# Patient Record
Sex: MALE | Race: WHITE | NOT HISPANIC OR LATINO | Employment: OTHER | URBAN - METROPOLITAN AREA
[De-identification: names, ages, dates, MRNs, and addresses within clinical notes are randomized per-mention and may not be internally consistent; named-entity substitution may affect disease eponyms.]

---

## 2017-03-10 ENCOUNTER — TELEPHONE (OUTPATIENT)
Dept: SPORTS MEDICINE | Facility: CLINIC | Age: 52
End: 2017-03-10

## 2017-03-10 ENCOUNTER — OFFICE VISIT (OUTPATIENT)
Dept: ORTHOPEDICS | Facility: CLINIC | Age: 52
End: 2017-03-10
Payer: COMMERCIAL

## 2017-03-10 VITALS
HEART RATE: 84 BPM | BODY MASS INDEX: 26.92 KG/M2 | RESPIRATION RATE: 18 BRPM | SYSTOLIC BLOOD PRESSURE: 129 MMHG | DIASTOLIC BLOOD PRESSURE: 88 MMHG | WEIGHT: 188 LBS | HEIGHT: 70 IN

## 2017-03-10 DIAGNOSIS — S46.111A BICEPS TENDON TEAR, RIGHT, INITIAL ENCOUNTER: Primary | ICD-10-CM

## 2017-03-10 PROCEDURE — 1160F RVW MEDS BY RX/DR IN RCRD: CPT | Mod: S$GLB,,, | Performed by: ORTHOPAEDIC SURGERY

## 2017-03-10 PROCEDURE — 99999 PR PBB SHADOW E&M-NEW PATIENT-LVL III: CPT | Mod: PBBFAC,,, | Performed by: ORTHOPAEDIC SURGERY

## 2017-03-10 PROCEDURE — 99203 OFFICE O/P NEW LOW 30 MIN: CPT | Mod: S$GLB,,, | Performed by: ORTHOPAEDIC SURGERY

## 2017-03-10 RX ORDER — RABEPRAZOLE SODIUM 20 MG/1
TABLET, DELAYED RELEASE ORAL
Refills: 2 | COMMUNITY
Start: 2017-02-23

## 2017-03-10 RX ORDER — SULFACETAMIDE SODIUM, SULFUR 100; 50 MG/G; MG/G
EMULSION TOPICAL
Refills: 1 | COMMUNITY
Start: 2017-01-10

## 2017-03-10 RX ORDER — BROMPHENIRAMINE MALEATE, PSEUDOEPHEDRINE HYDROCHLORIDE, AND DEXTROMETHORPHAN HYDROBROMIDE 2; 30; 10 MG/5ML; MG/5ML; MG/5ML
SYRUP ORAL
Refills: 5 | COMMUNITY
Start: 2017-02-23 | End: 2022-09-20

## 2017-03-10 RX ORDER — BUPROPION HYDROCHLORIDE 150 MG/1
TABLET ORAL
Refills: 1 | COMMUNITY
Start: 2017-02-23 | End: 2020-01-22 | Stop reason: SDUPTHER

## 2017-03-10 RX ORDER — FEXOFENADINE HYDROCHLORIDE AND PSEUDOEPHEDRINE HYDROCHLORIDE 60; 120 MG/1; MG/1
TABLET, FILM COATED, EXTENDED RELEASE ORAL
Refills: 2 | COMMUNITY
Start: 2017-03-06

## 2017-03-10 NOTE — TELEPHONE ENCOUNTER
The patient is referred to our office by Dr. Stoll. He was scheduled a visit to come see Hima Wen following his MRI

## 2017-03-10 NOTE — PROGRESS NOTES
"DATE: 3/10/2017  PATIENT: Noel Sage Jr.    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 The pain has been present for approximately 2 weeks. It began while attempting to pick-up a heavy object. He reports a "tearing" sensation at the anterior and medial aspect of the elbow. Since then, he has had persistent pain and weaknes. The patient describes the pain as burning.  The pain is worse with any weightbearing or elbow flexion, and improved by rest. There is no associated numbness and tingling.  No treatment thus far.      PAST MEDICAL/SURGICAL HISTORY:  History reviewed. No pertinent past medical history.  Past Surgical History:   Procedure Laterality Date    COLONOSCOPY      ESOPHAGOGASTRODUODENOSCOPY      LEG SURGERY Left     torn muscle, repaired, age 18's       Current Medications:   Current Outpatient Prescriptions:     ALLEGRA-D 12 HOUR  mg per tablet, , Disp: , Rfl: 2    buPROPion (WELLBUTRIN XL) 150 MG TB24 tablet, TK 1 T PO D, Disp: , Rfl: 1    LACTOBACILLUS ACIDOPHILUS (PROBIOTIC ORAL), Take by mouth once daily., Disp: , Rfl:     rabeprazole (ACIPHEX) 20 mg tablet, TK 1 T PO QD, Disp: , Rfl: 2    sulfacetamide sodium-sulfur 10-5 % (w/w) Clsr, , Disp: , Rfl: 1    brompheniramine-pseudoeph-DM 2-30-10 mg/5 mL Syrp, TK 1 TO 2 TEA PO Q 6 H PRN CNC, Disp: , Rfl: 5    Social History:   Social History     Social History    Marital status:      Spouse name: N/A    Number of children: N/A    Years of education: N/A     Occupational History    Not on file.     Social History Main Topics    Smoking status: Current Some Day Smoker     Types: Cigars    Smokeless tobacco: Never Used    Alcohol use Yes      Comment: on weekends occasionally    Drug use: No    Sexual activity: Not on file     Other Topics Concern    Not on file     Social History Narrative    No narrative on file       REVIEW OF " "SYSTEMS:  Constitution: Negative. Negative for chills, fever and night sweats.   Cardiovascular: Negative for chest pain and syncope.   Respiratory: Negative for cough and shortness of breath.   Gastrointestinal: See HPI. Negative for nausea/vomiting. Negative for abdominal pain.  Genitourinary: See HPI. Negative for discoloration or dysuria.  Skin: Negative for dry skin, itching and rash.   Hematologic/Lymphatic: Negative for bleeding problem. Does not bruise/bleed easily.   Musculoskeletal: Negative for falls and muscle weakness.   Neurological: See HPI. No seizures.   Endocrine: Negative for polydipsia, polyphagia and polyuria.   Allergic/Immunologic: Negative for hives and persistent infections.    PHYSICAL EXAMINATION:    /88 (BP Location: Left arm, Patient Position: Sitting, BP Method: Automatic)  Pulse 84  Resp 18  Ht 5' 10" (1.778 m)  Wt 85.3 kg (188 lb)  BMI 26.98 kg/m2    General: The patient is a very pleasant 51 y.o. male in no apparent distress, the patient is orientatied to person, place and time.   Psych: Normal mood and affect  HEENT:  NCAT  Lungs:  Respirations are equal and unlabored.  CV:  2+ bilateral upper and lower extremity pulses.  Skin:  Intact throughout.    MSK:   - Right UE: skin intact with no swelling or ecchymoses. Mildly TTP at biceps insertion on radius. No iza hook or francisca deformity. Substantial weakness with elbow flexion and supination. +biceps atrophy. Stable to varus/valgus stress. NVI distally.      ASSESSMENT/PLAN:    Noel was seen today for elbow injury.    Diagnoses and all orders for this visit:    Biceps tendon tear, right, initial encounter  -     MRI Upper Extremity Joint W Wo Contrast Right; Future      No Follow-up on file.    The patient's history and physical exam is concerning for an insertional biceps rupture versus strain.  Will order MRI and refer to the Sports Medicine clinic for further evaluation.    I have personally taken the history and " examined this patient and agree with the residents note as stated above.  Will call with result and refer to Sports medicine.

## 2017-03-14 ENCOUNTER — HOSPITAL ENCOUNTER (OUTPATIENT)
Dept: RADIOLOGY | Facility: HOSPITAL | Age: 52
Discharge: HOME | End: 2017-03-14
Attending: ORTHOPAEDIC SURGERY
Payer: COMMERCIAL

## 2017-03-14 DIAGNOSIS — S46.111A BICEPS TENDON TEAR, RIGHT, INITIAL ENCOUNTER: ICD-10-CM

## 2017-03-14 PROCEDURE — 73221 MRI JOINT UPR EXTREM W/O DYE: CPT | Mod: 26,RT,, | Performed by: RADIOLOGY

## 2017-03-14 PROCEDURE — 73221 MRI JOINT UPR EXTREM W/O DYE: CPT | Mod: TC,RT

## 2017-03-15 ENCOUNTER — TELEPHONE (OUTPATIENT)
Dept: SPORTS MEDICINE | Facility: CLINIC | Age: 52
End: 2017-03-15

## 2017-03-15 DIAGNOSIS — S46.119S: Primary | ICD-10-CM

## 2017-03-15 NOTE — TELEPHONE ENCOUNTER
Spoke with Karolyn about her 's appt with Hima on 3/16/17. Karolyn stated that her Noel has gotten medical clearance from his PCP for his upcoming surgery. Made her aware that the surgery will be with Dr. Wilder whenever she does schedule surgery for him.    ----- Message from Kamilah De León sent at 3/15/2017 11:23 AM CDT -----  Contact: [email or web address] [phone]  She is calling to speak with a staff member to see which Dr Joseph Will perform  Her  surgery.

## 2017-03-16 ENCOUNTER — HOSPITAL ENCOUNTER (OUTPATIENT)
Dept: RADIOLOGY | Facility: HOSPITAL | Age: 52
Discharge: HOME | End: 2017-03-16
Attending: ORTHOPAEDIC SURGERY
Payer: COMMERCIAL

## 2017-03-16 ENCOUNTER — OFFICE VISIT (OUTPATIENT)
Dept: SPORTS MEDICINE | Facility: CLINIC | Age: 52
End: 2017-03-16
Payer: COMMERCIAL

## 2017-03-16 VITALS
SYSTOLIC BLOOD PRESSURE: 129 MMHG | RESPIRATION RATE: 20 BRPM | BODY MASS INDEX: 26.92 KG/M2 | HEART RATE: 94 BPM | WEIGHT: 188 LBS | HEIGHT: 70 IN | TEMPERATURE: 98 F | DIASTOLIC BLOOD PRESSURE: 81 MMHG

## 2017-03-16 DIAGNOSIS — S46.211A RUPTURE OF DISTAL BICEPS TENDON, RIGHT, INITIAL ENCOUNTER: Primary | ICD-10-CM

## 2017-03-16 DIAGNOSIS — M25.521 RIGHT ELBOW PAIN: ICD-10-CM

## 2017-03-16 PROCEDURE — 73080 X-RAY EXAM OF ELBOW: CPT | Mod: 26,RT,, | Performed by: RADIOLOGY

## 2017-03-16 PROCEDURE — 1160F RVW MEDS BY RX/DR IN RCRD: CPT | Mod: S$GLB,,, | Performed by: PHYSICIAN ASSISTANT

## 2017-03-16 PROCEDURE — 73080 X-RAY EXAM OF ELBOW: CPT | Mod: TC,PO,RT

## 2017-03-16 PROCEDURE — 99203 OFFICE O/P NEW LOW 30 MIN: CPT | Mod: S$GLB,,, | Performed by: PHYSICIAN ASSISTANT

## 2017-03-16 PROCEDURE — 99999 PR PBB SHADOW E&M-EST. PATIENT-LVL V: CPT | Mod: PBBFAC,,, | Performed by: PHYSICIAN ASSISTANT

## 2017-03-16 RX ORDER — TRAMADOL HYDROCHLORIDE 50 MG/1
50-100 TABLET ORAL EVERY 6 HOURS PRN
Qty: 60 TABLET | Refills: 0 | Status: SHIPPED | OUTPATIENT
Start: 2017-03-16 | End: 2020-01-30

## 2017-03-16 RX ORDER — INDOMETHACIN 25 MG/1
75 CAPSULE ORAL DAILY
Qty: 12 CAPSULE | Refills: 0 | Status: ON HOLD | OUTPATIENT
Start: 2017-03-16 | End: 2017-03-21 | Stop reason: HOSPADM

## 2017-03-16 RX ORDER — NAPROXEN 500 MG/1
500 TABLET ORAL EVERY 12 HOURS
Qty: 42 TABLET | Refills: 0 | Status: ON HOLD | OUTPATIENT
Start: 2017-03-16 | End: 2017-03-21 | Stop reason: HOSPADM

## 2017-03-16 RX ORDER — OXYCODONE HCL 10 MG/1
10 TABLET, FILM COATED, EXTENDED RELEASE ORAL
Status: CANCELLED | OUTPATIENT
Start: 2017-03-16 | End: 2017-03-16

## 2017-03-16 RX ORDER — OXYCODONE AND ACETAMINOPHEN 10; 325 MG/1; MG/1
TABLET ORAL
Qty: 60 TABLET | Refills: 0 | Status: SHIPPED | OUTPATIENT
Start: 2017-03-16 | End: 2020-01-30

## 2017-03-16 RX ORDER — PROMETHAZINE HYDROCHLORIDE 25 MG/1
25 TABLET ORAL EVERY 6 HOURS PRN
Qty: 30 TABLET | Refills: 0 | Status: SHIPPED | OUTPATIENT
Start: 2017-03-16 | End: 2022-09-20

## 2017-03-16 RX ORDER — PREGABALIN 25 MG/1
75 CAPSULE ORAL
Status: CANCELLED | OUTPATIENT
Start: 2017-03-16 | End: 2017-03-16

## 2017-03-16 NOTE — MR AVS SNAPSHOT
[facility]  [address]  [facility] [address]  Phone: [phone]                  Noel Sage JrOpal   3/16/2017 1:00 PM   Appointment    Description:  Male : 1965   Provider:  Chandler Wne III, PA-C   Department:  [facility]                To Do List           Future Appointments        Provider Department Dept Phone    3/16/2017 1:00 PM Chandler Wen III, PA-C [facility] [phone]    3/17/2017 8:30 AM PRE-ADMIT, [facility] [phone]    3/31/2017 10:30 AM Mara Wilder MD [facility] [phone]      Your Future Surgeries/Procedures     Mar 21, 2017   Surgery with Mara Wilder MD   [facility] ([facility])    [address]  [facility] [address] [phone]              Goals (5 Years of Data)     None      [facility]     [facility] On [facility] Nurse Care Line -  Assistance  Registered nurses in the [facility] Center provide clinical advisement, health education, appointment booking, and other advisory services.  Call for this free service at [phone].             Medications                Verify that the below list of medications is an accurate representation of the medications you are currently taking.  If none reported, the list may be blank. If incorrect, please contact your healthcare provider. Carry this list with you in case of emergency.           Current Medications     ALLEGRA-D 12 HOUR  mg per tablet     brompheniramine-pseudoeph-DM 2-30-10 mg/5 mL Syrp TK 1 TO 2 TEA PO Q 6 H PRN CNC    buPROPion (WELLBUTRIN XL) 150 MG TB24 tablet TK 1 T PO D    LACTOBACILLUS ACIDOPHILUS (PROBIOTIC ORAL) Take by mouth once daily.    rabeprazole (ACIPHEX) 20 mg tablet TK 1 T PO QD    sulfacetamide sodium-sulfur 10-5 % (w/w) Clsr            Clinical Reference Information           Allergies as of 3/16/2017     Penicillins       Immunizations Administered on Date of Encounter - 3/16/2017     None      [facility] Sign-Up     Activating your [facility] account is as easy as 1-2-3!     1) Visit [email or web address], select Sign Up Now, enter this activation code and your date of birth, then select Next.  [email or web address]  Expires: 4/30/2017 12:50 PM      2) Create a username and password to use when you visit [facility] in the future and select a security question in case you lose your password and select Next.    3) Enter your e-mail address and click Sign Up!    Additional Information  If you have questions, please e-mail [email or web address] or call [phone] to talk to our [facility] staff. Remember, [facility] is NOT to be used for urgent needs. For medical emergencies, dial 911.         Smoking Cessation     If you would like to quit smoking:   You may be eligible for free services if you are a [place] resident and started smoking cigarettes before September 1, 1988.  Call the Smoking Cessation Trust ([facility]) toll free at (413) [phone] or ([phone].   Call 8-283-QUIT-NOW if you do not meet the above criteria.            Language Assistance Services     ATTENTION: Language assistance services are available, free of charge. Please call [phone].      ATENCIÓN: Si habla español, tiene a james disposición servicios gratuitos de asistencia lingüística. Llame al [phone].     CHÚ Ý: N?u b?n nói Ti?ng Vi?t, có các d?ch v? h? tr? ngôn ng? mi?n phí dành cho b?n. G?i s? [phone].         [facility] complies with applicable Federal civil rights laws and does not discriminate on the basis of race, color, national origin, age, disability, or sex.

## 2017-03-16 NOTE — H&P
Noel Sage Jr.  is here for a completion of his perioperative paperwork. he  Is scheduled to undergo     Right Distal short head biceps repair +/- tendon allograft assistance on 3/21/18.      He is a healthy individual and does need clearance for this procedure Which he got from Dr. Espinoza.     Risks, indications and benefits of the surgical procedure were discussed with the patient. All questions with regard to surgery, rehab, expected return to functional activities, activities of daily living and recreational endeavors were answered to his satisfaction.    Once no other questions were asked, a brief history and physical exam was then performed.      PHYSICAL EXAM:  GEN: A&Ox3, WD WN NAD  HEENT: WNL  CHEST: CTAB, no W/R/R  HEART: RRR, no M/R/G  ABD: Soft, NT ND, BS x4 QUADS  MS; See Epic  NEURO: CN II-XII intact       The surgical consent was then reviewed with the patient, who agreed with all the contents of the consent form and it was signed. he was then given the [facility] surgery packet to bring with him to [facility] for the anesthesia portion of his perioperative paperwork.   For all physicians except for Dr. Grigsby, we will email and possibly fax the consent forms and [facility]    PHYSICAL THERAPY:  He was also instructed regarding physical therapy and will begin on  TBD date. He was given a copy of the original prescription to schedule. Another copy of this prescription was also faxed to [facility].    POST OP CARE:instructions were reviewed including care of the wound and dressing after surgery and when he can shower.     PAIN MANAGEMENT: Noel Sage Jr. was also given his pain management regime, which includes the TENS unit given to him by umesh along with the education required for its use. He was also instructed regarding the Polar ice unit that will be in place after surgery and his postoperative pain medications.     PAIN MEDICATION:  Percocet  10/325mg 1 po q 4-6 hours prn pain  Ultram 50 mg one p.o. q.4-6 hours p.r.n. breakthrough pain,   Phenergan 25 mg one p.o. q.4-6 hours p.r.n. nausea and vomiting.  Indocin 75mg po x 4 days  Naproxen 500mg po BID x 21 days (starting on POD5)  aciphex as already taking    As there were no other questions to be asked, he was given my business card along with Mara Wilder MD business card if he has any questions or concerns prior to surgery or in the postop period.

## 2017-03-16 NOTE — PROGRESS NOTES
CHIEF COMPLAINT: Right Elbow pain referral from Dr. Stoll (friend)                                             HISTORY OF PRESENT ILLNESS:  The patient is a 51 y.o. male  who presents  for evaluation of his right elbow pain.     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    He states that last week he was lifting a box and felt a pop and tear around his elbow. He then began having bruising and pain of the distal arm. He now has weakness of his arm and difficulty pronating and supinating the forearm.    Pain Duration: 1 weeks  Pain Quality: achy  Pain Context:unchanged  Pain Timing: intermittent  Pain Location:anterior  Pain Severity: mild  Aggrevating Factors:    Previous Treatments:  Associated Signs and Symptoms:none  History of Trauma: None    Pain is affecting ADLs.       PAST MEDICAL HISTORY: History reviewed. No pertinent past medical history.  PAST SURGICAL HISTORY:   Past Surgical History:   Procedure Laterality Date    COLONOSCOPY      ESOPHAGOGASTRODUODENOSCOPY      LEG SURGERY Left     torn muscle, repaired, age 18's     FAMILY HISTORY: History reviewed. No pertinent family history.  SOCIAL HISTORY:   Social History     Social History    Marital status:      Spouse name: N/A    Number of children: N/A    Years of education: N/A     Occupational History    Not on file.     Social History Main Topics    Smoking status: Current Some Day Smoker     Types: Cigars    Smokeless tobacco: Never Used    Alcohol use Yes      Comment: on weekends occasionally    Drug use: No    Sexual activity: Not on file     Other Topics Concern    Not on file     Social History Narrative       MEDICATIONS:   Current Outpatient Prescriptions:     ALLEGRA-D 12 HOUR  mg per tablet, , Disp: , Rfl: 2    brompheniramine-pseudoeph-DM 2-30-10 mg/5 mL Syrp, TK 1 TO 2 TEA PO Q 6 H PRN CNC, Disp: , Rfl: 5    buPROPion (WELLBUTRIN XL) 150 MG TB24 tablet, TK 1 T PO D, Disp: , Rfl:  "1    LACTOBACILLUS ACIDOPHILUS (PROBIOTIC ORAL), Take by mouth once daily., Disp: , Rfl:     rabeprazole (ACIPHEX) 20 mg tablet, TK 1 T PO QD, Disp: , Rfl: 2    indomethacin (INDOCIN) 25 MG capsule, Take 3 capsules (75 mg total) by mouth once daily. Take with food. Take on post-op days 1,2,3,&4., Disp: 12 capsule, Rfl: 0    naproxen (NAPROSYN) 500 MG tablet, Take 1 tablet (500 mg total) by mouth every 12 (twelve) hours. Take with food. Starting on post-op day 5, Disp: 42 tablet, Rfl: 0    oxycodone-acetaminophen (PERCOCET)  mg per tablet, Take 1 tablet by mouth every 4-6 hours as needed for pain. Take stool softener with this medication., Disp: 60 tablet, Rfl: 0    promethazine (PHENERGAN) 25 MG tablet, Take 1 tablet (25 mg total) by mouth every 6 (six) hours as needed for Nausea., Disp: 30 tablet, Rfl: 0    sulfacetamide sodium-sulfur 10-5 % (w/w) Clsr, , Disp: , Rfl: 1    tramadol (ULTRAM) 50 mg tablet, Take 1-2 tablets ( mg total) by mouth every 6 (six) hours as needed for Pain., Disp: 60 tablet, Rfl: 0  ALLERGIES:   Review of patient's allergies indicates:   Allergen Reactions    Penicillins      UNKNOWN       VITAL SIGNS: /81  Pulse 94  Temp 98.2 °F (36.8 °C) (Oral)   Resp 20  Ht 5' 10" (1.778 m)  Wt 85.3 kg (188 lb)  BMI 26.98 kg/m2     Review of Systems   Constitution: Negative for chills, fever, weakness and weight loss.   HENT: Negative for congestion.   Cardiovascular: Negative for chest pain and dyspnea on exertion.   Respiratory: Negative for cough and shortness of breath.   Hematologic/Lymphatic: Does not bruise/bleed easily.   Skin: Negative for rash and suspicious lesions.   Musculoskeletal: see HPI  Gastrointestinal: Negative for bowel incontinence, constipation,diarrhea, vomiting.   Genitourinary: Negative for bladder incontinence.   Neurological: Negative for numbness, paresthesias and sensory change.       PHYSICAL EXAM:  General: Patient appears alert and oriented " x 3.  Mood is pleasant.  Observation of ears, eyes and nose reveal no gross abnormalities.  No labored breathing observed.  Well nourished, in no acute distress and ambulates with a non-antalgic gait with no assistive devices.    Skin: Skin intact bilaterally. Sensation intact bilaterally. Compartments soft. No evidence of edema, infection, or induration.     Right ELBOW / WRIST EXTREMITY EXAM:    OBSERVATION / INSPECTION    Swelling  Mild of the distal arm    Deformity  none  Discoloration  Ecchymosis appreciated of the medial distal arm     Scars   none    Atrophy  none    TENDERNESS / CREPITUS (T / C):           T / C        Medial epicondyle   - / -    Med. (Ulnar) collateral ligament  - / -    Flexor pronator Musculature   - / -   Biceps tendon    + / -   Head of radius    - / -    Lateral epicondyle   - / -    Extensor Musculature   - / -   Brachioradialis   - / -   Triceps tendon   - / -   Triceps muscle   - / -   Olecranon    - / -   Olecranon bursa   - / -   Cubital fossa    - / -   Anterior jointline   - / -   Radial tunnel    -/ -             ROM: ('*' = with pain)    Right Elbow  AROM (PROM)     Extension   0 deg  (5 deg)   Flexion   145 deg (145 deg)         Pronation  90 deg  (90 deg)      Supination   80 deg  (80 deg)                 Left Elbow  AROM (PROM)     Extension   0 deg  (5 deg)   Flexion   145 deg (145 deg)*         Pronation  80 deg  (90 deg)*      Supination   80 deg  (80 deg)        Right Wrist  AROM (PROM)   Extension   80 deg (85 deg)   Flexion   80 deg (85 deg)         Ulnar Deviation   35 deg (40 deg)  Radial Deviation 35 deg (40 deg)             Left Wrist   AROM (PROM)     Extension   80 deg (85 deg)   Flexion   80 deg (85 deg)         Ulnar Deviation   35 deg (40 deg)  Radial Deviation 35 deg (40 deg)        STRENGTH: ('*' = with pain)    Elbow Flexion:   3+/5  Elbow Extension:  5/5  Wrist Flexion:   5/5  Wrist Extension:  5/5  :    5/5  Intrinsics:   5/5  EPL (Ext. pollicis  longus): 5/5  Pinch Mechanism:  5/5    ELBOW EXAMINATION:  See above noted areas of tenderness.   Test for Ligamentous Instability - UCL normal  Test for Ligamentous Instability - LUCL normal  PLRI       neg  Tinel's (Percussion) Test - Cubital  neg  Tennis Elbow Test    neg  Golfer's Elbow Test    neg  Radial Capitellar Grind Test   neg  Valgus/Extension Overload Test  neg  Resisted Long Finger Extension Pain neg  Moving Valgus     neg  Forearm pain with resisted supination neg    WRIST EXAMINATION:  See above noted areas of tenderness.   Finkelstein's Test   neg  Tinel's Test - Carpal Tunnel  neg  Phalen's Test    neg  Median Nerve Compression Test neg  Ulnar-sided Compression Test neg  LT Ballottment Test   neg  Snuff box tenderness   neg  Drew's Test   neg  LT Instability    neg  Hook of Hamate Tenderness  neg     EXTREMITY NEURO-VASCULAR EXAMINATION: Sensation grossly intact to light touch all dermatomal regions. DTR 2+ Biceps, Triceps, BR and Negative Mela's sign. Grossly intact motor function at Elbow, Wrist and Hand. Distal pulses radial and ulnar 2+, brisk cap refill, symmetric.    Other Findings:      Xrays: (AP, lateral, oblique) Right elbow ordered and reviewed by me personally today: no evidence of fracture or dislocation.  Osseous structures appear intact.    MRI: on 3/14/17  Complete tear of the short head component of the biceps tendon at the level of the radial tuberosity with approximately 2 cm of proximal retraction. Long head component demonstrates intact fibers inserting at the proximal aspect of the radial tuberosity. Lacertus fibrosus is intact.    ASSESSMENT:   Right elbow pain  Distal short head of biceps tendon rupture    PLAN:  I have discussed the nature of this problem with the patient today. We discussed both surgical and non-surgical options.     1. Patient would like to proceed with surgery.     PLAN: We have discussed the surgery and recovery of elbow surgery. he understands  that there may be limited arm mobility and a sling required for up to several weeks after surgery depending on procedures that are performed at the time of surgery.    The spectrum of treatment options were discussed with the patient, including nonoperative and operative options.  After thorough discussion, the patient has elected to undergo surgical treatment to include:  right  a. Distal short head biceps tendon repair +/- allograft assistance     The details of the surgical procedure were explained, including the location of probable incisions and a description of likely hardware and/or grafts to be used.  The patient understands the likely convalescence after surgery.  Also, we have thoroughly discussed the risks, benefits and alternatives to surgery, including, but not limited to, the risk of infection, joint stiffness, blood clot (including DVT and/or pulmonary embolus), neurologic and vascular injury.  It was explained that, if tissue has been repaired or reconstructed, there is a chance of failure, which may require further management.        2. Surgery in scheduled for next Tuesday the 21st.   3. Pre-op also performed at this visit.   All patients questions were answered. Patient was advised to call us with any concerns or questions.

## 2017-03-17 ENCOUNTER — HOSPITAL ENCOUNTER (OUTPATIENT)
Dept: PREADMISSION TESTING | Facility: OTHER | Age: 52
Discharge: HOME | End: 2017-03-17
Attending: ORTHOPAEDIC SURGERY
Payer: COMMERCIAL

## 2017-03-17 ENCOUNTER — ANESTHESIA EVENT (OUTPATIENT)
Dept: SURGERY | Facility: OTHER | Age: 52
End: 2017-03-17
Payer: COMMERCIAL

## 2017-03-17 VITALS
TEMPERATURE: 98 F | WEIGHT: 188 LBS | HEART RATE: 83 BPM | BODY MASS INDEX: 26.92 KG/M2 | DIASTOLIC BLOOD PRESSURE: 87 MMHG | OXYGEN SATURATION: 97 % | HEIGHT: 70 IN | SYSTOLIC BLOOD PRESSURE: 135 MMHG

## 2017-03-17 RX ORDER — SODIUM CHLORIDE 0.9 % (FLUSH) 0.9 %
3 SYRINGE (ML) INJECTION EVERY 8 HOURS
Status: CANCELLED | OUTPATIENT
Start: 2017-03-17

## 2017-03-17 RX ORDER — FAMOTIDINE 20 MG/1
20 TABLET, FILM COATED ORAL
Status: CANCELLED | OUTPATIENT
Start: 2017-03-17 | End: 2017-03-17

## 2017-03-17 RX ORDER — ACETAMINOPHEN 500 MG
500 TABLET ORAL EVERY 6 HOURS PRN
COMMUNITY

## 2017-03-17 RX ORDER — SODIUM CHLORIDE 0.9 % (FLUSH) 0.9 %
3 SYRINGE (ML) INJECTION
Status: DISCONTINUED | OUTPATIENT
Start: 2017-03-17 | End: 2017-03-18 | Stop reason: HOSPADM

## 2017-03-17 RX ORDER — ONDANSETRON 2 MG/ML
4 INJECTION INTRAMUSCULAR; INTRAVENOUS ONCE AS NEEDED
Status: CANCELLED | OUTPATIENT
Start: 2017-03-17 | End: 2017-03-17

## 2017-03-17 RX ORDER — FENTANYL CITRATE 50 UG/ML
25 INJECTION, SOLUTION INTRAMUSCULAR; INTRAVENOUS EVERY 5 MIN PRN
Status: CANCELLED | OUTPATIENT
Start: 2017-03-17

## 2017-03-17 RX ORDER — OXYCODONE HYDROCHLORIDE 5 MG/1
5 TABLET ORAL
Status: CANCELLED | OUTPATIENT
Start: 2017-03-17

## 2017-03-17 RX ORDER — MIDAZOLAM HYDROCHLORIDE 5 MG/ML
5 INJECTION INTRAMUSCULAR; INTRAVENOUS
Status: DISPENSED | OUTPATIENT
Start: 2017-03-17 | End: 2017-03-17

## 2017-03-17 RX ORDER — MEPERIDINE HYDROCHLORIDE 50 MG/ML
12.5 INJECTION INTRAMUSCULAR; INTRAVENOUS; SUBCUTANEOUS ONCE AS NEEDED
Status: CANCELLED | OUTPATIENT
Start: 2017-03-17 | End: 2017-03-17

## 2017-03-17 RX ORDER — HYDROMORPHONE HYDROCHLORIDE 2 MG/ML
0.4 INJECTION, SOLUTION INTRAMUSCULAR; INTRAVENOUS; SUBCUTANEOUS EVERY 5 MIN PRN
Status: CANCELLED | OUTPATIENT
Start: 2017-03-17

## 2017-03-17 NOTE — IP AVS SNAPSHOT
[facility] Location ([place])  [address] [place], [place] [id number]  Phone: [phone]           Patient Discharge Instructions    Our goal is to set you up for success. This packet includes information on your condition, medications, and your home care. It will help you to care for yourself so you don't get sicker.     Please ask your nurse if you have any questions.        There are many details to remember when preparing for your surgery. Here is what you will need to do, please ask your nurse if there are more specific instructions and if you have any questions:    1. 24 hours before procedure Do not smoke or drink alcoholic beverages 24 hours prior to your procedure    2. Eating before procedure Do not eat or drink anything 8 hours before your procedure - this includes gum, mints, and candy.     3. Day of procedure Please remove all jewelry for the procedure. If you wear contact lenses, dentures, hearing aids or glasses, bring a container to put them in during your surgery and give to a family member for safekeeping.  If your doctor has scheduled you for an overnight stay, bring a small overnight bag with any personal items that you need.    4. After procedure Make arrangements in advance for transportation home by a responsible adult. It is not safe to drive a vehicle during the 24 hours following surgery.     PLEASE NOTE: You may be contacted the day before your surgery to confirm your surgery date and arrival time. The Surgery schedule has many variables which may affect the time of your surgery case. Family members should be available if your surgery time changes.                [facility]  Unless otherwise directed by your provider, please contact [facility], our nurse care line that is available for 24/7 assistance.     [phone] (toll-free)    Registered nurses in the [facility] provide clinical advisement, health education, appointment booking, and other  advisory services.                    ** Verify the list of medication(s) below is accurate and up to date. Carry this with you in case of emergency. If your medications have changed, please notify your healthcare provider.             Medication List      TAKE these medications        Additional Info                      acetaminophen 500 MG tablet   Commonly known as:  TYLENOL   Refills:  0   Dose:  500 mg    Instructions:  Take 500 mg by mouth every 6 (six) hours as needed for Pain.     Begin Date    AM    Noon    PM    Bedtime       ALLEGRA-D 12 HOUR  mg per tablet   Refills:  2   Generic drug:  fexofenadine-pseudoephedrine  mg      Begin Date    AM    Noon    PM    Bedtime       brompheniramine-pseudoeph-DM 2-30-10 mg/5 mL Syrp   Refills:  5    Instructions:  TK 1 TO 2 TEA PO Q 6 H PRN CNC     Begin Date    AM    Noon    PM    Bedtime       buPROPion 150 MG TB24 tablet   Commonly known as:  WELLBUTRIN XL   Refills:  1    Instructions:  TK 1 T PO D     Begin Date    AM    Noon    PM    Bedtime       indomethacin 25 MG capsule   Commonly known as:  INDOCIN   Quantity:  12 capsule   Refills:  0   Dose:  75 mg    Instructions:  Take 3 capsules (75 mg total) by mouth once daily. Take with food. Take on post-op days 1,2,3,&4.     Begin Date    AM    Noon    PM    Bedtime       naproxen 500 MG tablet   Commonly known as:  NAPROSYN   Quantity:  42 tablet   Refills:  0   Dose:  500 mg    Instructions:  Take 1 tablet (500 mg total) by mouth every 12 (twelve) hours. Take with food. Starting on post-op day 5     Begin Date    AM    Noon    PM    Bedtime       oxycodone-acetaminophen  mg per tablet   Commonly known as:  PERCOCET   Quantity:  60 tablet   Refills:  0    Instructions:  Take 1 tablet by mouth every 4-6 hours as needed for pain. Take stool softener with this medication.     Begin Date    AM    Noon    PM    Bedtime       PROBIOTIC ORAL   Refills:  0    Instructions:  Take by mouth once daily.      Begin Date    AM    Noon    PM    Bedtime       promethazine 25 MG tablet   Commonly known as:  PHENERGAN   Quantity:  30 tablet   Refills:  0   Dose:  25 mg    Instructions:  Take 1 tablet (25 mg total) by mouth every 6 (six) hours as needed for Nausea.     Begin Date    AM    Noon    PM    Bedtime       psyllium packet   Commonly known as:  METAMUCIL   Refills:  0   Dose:  1 packet    Instructions:  Take 1 packet by mouth once daily.     Begin Date    AM    Noon    PM    Bedtime       rabeprazole 20 mg tablet   Commonly known as:  ACIPHEX   Refills:  2    Instructions:  TK 1 T PO QD     Begin Date    AM    Noon    PM    Bedtime       sulfacetamide sodium-sulfur 10-5 % (w/w) Clsr   Refills:  1      Begin Date    AM    Noon    PM    Bedtime       tramadol 50 mg tablet   Commonly known as:  ULTRAM   Quantity:  60 tablet   Refills:  0   Dose:   mg    Instructions:  Take 1-2 tablets ( mg total) by mouth every 6 (six) hours as needed for Pain.     Begin Date    AM    Noon    PM    Bedtime                  Please bring to all follow up appointments:    1. A copy of your discharge instructions.  2. All medicines you are currently taking in their original bottles.  3. Identification and insurance card.    Please arrive 15 minutes ahead of scheduled appointment time.    Please call 24 hours in advance if you must reschedule your appointment and/or time.        Your Scheduled Appointments     Mar 17, 2017  8:30 AM CDT   Pre-Admit Testing Visit with PRE-ADMIT, [facility]    [address]  [facility] [address]   [phone]            Mar 31, 2017 10:30 AM CDT   Post OP with Mara Wilder MD   [facility]    [address]  [facility] [address]   [phone]              Your Future Surgeries/Procedures     Mar 21, 2017   Surgery with Mara Wilder MD   [facility] ([facility])    [address]  [facility] [address]   [phone]                  Discharge Instructions       PRE-ADMIT TESTING -  [phone]    [address]  [facility]        OUTPATIENT SURGERY UNIT - [phone]    Your surgery has been scheduled at [facility]. We are pleased to have the opportunity to serve you. For Further Information please call [phone].    On the day of surgery please report to the Information Desk on the 1st floor.    CONTACT YOUR PHYSICIAN'S OFFICE THE DAY PRIOR TO YOUR SURGERY TO OBTAIN YOUR ARRIVAL TIME.     The evening before surgery do not eat anything after 9 p.m. ( this includes hard candy, chewing gum and mints).  You may have GATORADE, POWERADE AND WATER FROM 9 p.m. until leaving home to come to the hospital.   DO NOT DRINK ANY LIQUIDS ON THE WAY TO THE HOSPITAL.     SPECIAL MEDICATION INSTRUCTIONS: TAKE medications checked off by the Anesthesiologist on your Medication List.    Angiogram Patients: Take medications as instructed by your physician, including aspirin.     Surgery Patients:    If you take ASPIRIN - Your PHYSICIAN/SURGEON will need to inform you IF/OR when you need to stop taking aspirin prior to your surgery.     Do Not take any medications containing IBUPROFEN.  Do Not Wear any make-up or dark nail polish   (especially eye make-up) to surgery. If you come to surgery with makeup on you will be required to remove the makeup or nail polish.    Do not shave your surgical area at least 5 days prior to your surgery. The surgical prep will be performed at the hospital according to Infection Control regulations.    Leave all valuables at home.   Do Not wear any jewelry or watches, including any metal in body piercings.  Contact Lens must be removed before surgery. Either do not wear the contact lens or bring a case and solution for storage.  Please bring a container for eyeglasses or dentures as required.  Bring any paperwork your physician has  "provided, such as consent forms,  history and physicals, doctor's orders, etc.   Bring comfortable clothes that are loose fitting to wear upon discharge. Take into consideration the type of surgery being performed.  Maintain your diet as advised per your physician the day prior to surgery.      Adequate rest the night before surgery is advised.   Park in the Parking lot behind the hospital or in the [facility] across the street from the parking lot. Parking is complimentary.  If you will be discharged the same day as your procedure, please arrange for a responsible adult to drive you home or to accompany you if traveling by taxi.   YOU WILL NOT BE PERMITTED TO DRIVE OR TO LEAVE THE HOSPITAL ALONE AFTER SURGERY.   It is strongly recommended that you arrange for someone to remain with you for the first 24 hrs following your surgery.       Thank you for your cooperation.  The Staff of [facility].        Bathing Instructions                                                                 Please shower the evening before and morning of your procedure with    ANTIBACTERIAL SOAP. ( DIAL, etc )  Concentrate on the surgical area   for at least 3 minutes and rinse completely. Dry off as usual.   Do not use any deodorant, powder, body lotions, perfume, after shave or    cologne.                                                Admission Information     Date & Time Provider Department CSN    3/17/2017  8:30 AM Mara Wilder MD [facility] [id number]      Care Providers     Provider Role Specialty Primary office phone    Mara Wilder MD Attending Provider Sports Medicine [phone]      Your Vitals Were     BP Pulse Temp Height Weight SpO2    129/93 83 98.4 °F (36.9 °C) (Oral) 5' 10" (1.778 m) 85.3 kg (188 lb) 97%    BMI                26.98 kg/m2          Recent Lab Values     No lab values to display.      Allergies as of 3/17/2017        Reactions    Penicillins     UNKNOWN    "   Advance Directives     An advance directive is a document which, in the event you are no longer able to make decisions for yourself, tells your healthcare team what kind of treatment you do or do not want to receive, or who you would like to make those decisions for you.  If you do not currently have an advance directive, [facility] encourages you to create one.  For more information call:  [phone]), [phone] ([phone]),  or log on to [email or web address].        Language Assistance Services     ATTENTION: Language assistance services are available, free of charge. Please call [phone].      ATENCIÓN: Si jose singh, tiene a james disposición servicios gratuitos de asistencia lingüística. Llame al [phone].     CHÚ Ý: N?u b?n nói Ti?ng Vi?t, có các d?ch v? h? tr? ngôn ng? mi?n phí dành cho b?n. G?i s? [phone].        [facility] Sign-Up     Activating your [facility] account is as easy as 1-2-3!     1) Visit [email or web address], select Sign Up Now, enter this activation code and your date of birth, then select Next.  [email or web address]  Expires: 4/30/2017 12:50 PM      2) Create a username and password to use when you visit [facility] in the future and select a security question in case you lose your password and select Next.    3) Enter your e-mail address and click Sign Up!    Additional Information  If you have questions, please e-mail [email or web address] or call [phone] to talk to our [facility] staff. Remember, [facility] is NOT to be used for urgent needs. For medical emergencies, dial 911.          [facility] complies with applicable Federal civil rights laws and does not discriminate on the basis of race, color, national origin, age, disability, or sex.

## 2017-03-17 NOTE — ANESTHESIA PREPROCEDURE EVALUATION
03/17/2017  Noel Sage Jr. is a 51 y.o., male.    [facility] Anesthesia Evaluation    I have reviewed the Patient Summary Reports.    I have reviewed the Nursing Notes.   I have reviewed the Medications.     Review of Systems  Anesthesia Hx:  No problems with previous Anesthesia  Denies Family Hx of Anesthesia complications.   Denies Personal Hx of Anesthesia complications.   Social:  Non-Smoker    Hematology/Oncology:  Hematology Normal   Oncology Normal     Cardiovascular:  Cardiovascular Normal     Pulmonary:  Pulmonary Normal    Renal/:  Renal/ Normal     Hepatic/GI:   GERD, well controlled    Musculoskeletal:  Musculoskeletal Normal    Neurological:  Neurology Normal    Endocrine:  Endocrine Normal        Physical Exam  General:  Well nourished    Airway/Jaw/Neck:  Airway Findings: Mouth Opening: Normal Tongue: Normal  General Airway Assessment: Adult  Mallampati: I  TM Distance: Normal, at least 6 cm         Dental:  Dental Findings: In tact, molar caps             Anesthesia Plan  Type of Anesthesia, risks & benefits discussed:  Anesthesia Type:  general  Patient's Preference:   Intra-op Monitoring Plan:   Intra-op Monitoring Plan Comments:   Post Op Pain Control Plan: single-shot nerve block  Post Op Pain Control Plan Comments:   Induction:    Beta Blocker:         Informed Consent: Patient understands risks and agrees with Anesthesia plan.  Questions answered. Anesthesia consent signed with patient.  ASA Score: 2     Day of Surgery Review of History & Physical:    H&P update referred to the surgeon.         Ready For Surgery From Anesthesia Perspective.

## 2017-03-17 NOTE — DISCHARGE INSTRUCTIONS
PRE-ADMIT TESTING -  [phone]    [address]  [facility]        OUTPATIENT SURGERY UNIT - [phone]    Your surgery has been scheduled at [facility]. We are pleased to have the opportunity to serve you. For Further Information please call [phone].    On the day of surgery please report to the Information Desk on the 1st floor.    CONTACT YOUR PHYSICIAN'S OFFICE THE DAY PRIOR TO YOUR SURGERY TO OBTAIN YOUR ARRIVAL TIME.     The evening before surgery do not eat anything after 9 p.m. ( this includes hard candy, chewing gum and mints).  You may have GATORADE, POWERADE AND WATER FROM 9 p.m. until leaving home to come to the hospital.   DO NOT DRINK ANY LIQUIDS ON THE WAY TO THE HOSPITAL.     SPECIAL MEDICATION INSTRUCTIONS: TAKE medications checked off by the Anesthesiologist on your Medication List.    Angiogram Patients: Take medications as instructed by your physician, including aspirin.     Surgery Patients:    If you take ASPIRIN - Your PHYSICIAN/SURGEON will need to inform you IF/OR when you need to stop taking aspirin prior to your surgery.     Do Not take any medications containing IBUPROFEN.  Do Not Wear any make-up or dark nail polish   (especially eye make-up) to surgery. If you come to surgery with makeup on you will be required to remove the makeup or nail polish.    Do not shave your surgical area at least 5 days prior to your surgery. The surgical prep will be performed at the hospital according to Infection Control regulations.    Leave all valuables at home.   Do Not wear any jewelry or watches, including any metal in body piercings.  Contact Lens must be removed before surgery. Either do not wear the contact lens or bring a case and solution for storage.  Please bring a container for eyeglasses or dentures as required.  Bring any paperwork your physician has provided, such as consent forms,  history and physicals, doctor's orders, etc.   Bring comfortable  clothes that are loose fitting to wear upon discharge. Take into consideration the type of surgery being performed.  Maintain your diet as advised per your physician the day prior to surgery.      Adequate rest the night before surgery is advised.   Park in the Parking lot behind the hospital or in the [place] [facility] across the street from the parking lot. Parking is complimentary.  If you will be discharged the same day as your procedure, please arrange for a responsible adult to drive you home or to accompany you if traveling by taxi.   YOU WILL NOT BE PERMITTED TO DRIVE OR TO LEAVE THE HOSPITAL ALONE AFTER SURGERY.   It is strongly recommended that you arrange for someone to remain with you for the first 24 hrs following your surgery.       Thank you for your cooperation.  The Staff of [facility].        Bathing Instructions                                                                 Please shower the evening before and morning of your procedure with    ANTIBACTERIAL SOAP. ( DIAL, etc )  Concentrate on the surgical area   for at least 3 minutes and rinse completely. Dry off as usual.   Do not use any deodorant, powder, body lotions, perfume, after shave or    cologne.

## 2017-03-20 ENCOUNTER — TELEPHONE (OUTPATIENT)
Dept: SPORTS MEDICINE | Facility: CLINIC | Age: 52
End: 2017-03-20

## 2017-03-20 NOTE — TELEPHONE ENCOUNTER
Called patient and LM about surgery time and arrival time for tomorrow. Explained on voicemail that me had some surgery cancellations and we were not able to schedule him with a late surgery time. All of the surgery times were early in the morning.

## 2017-03-21 ENCOUNTER — ANESTHESIA (OUTPATIENT)
Dept: SURGERY | Facility: OTHER | Age: 52
End: 2017-03-21
Payer: COMMERCIAL

## 2017-03-21 ENCOUNTER — HOSPITAL ENCOUNTER (OUTPATIENT)
Facility: OTHER | Age: 52
Discharge: HOME | End: 2017-03-21
Attending: ORTHOPAEDIC SURGERY | Admitting: ORTHOPAEDIC SURGERY
Payer: COMMERCIAL

## 2017-03-21 VITALS
HEART RATE: 92 BPM | BODY MASS INDEX: 26.92 KG/M2 | OXYGEN SATURATION: 96 % | HEIGHT: 70 IN | WEIGHT: 188 LBS | RESPIRATION RATE: 16 BRPM | DIASTOLIC BLOOD PRESSURE: 80 MMHG | SYSTOLIC BLOOD PRESSURE: 134 MMHG | TEMPERATURE: 99 F

## 2017-03-21 DIAGNOSIS — M25.511 RIGHT SHOULDER PAIN, UNSPECIFIED CHRONICITY: ICD-10-CM

## 2017-03-21 DIAGNOSIS — M25.521 RIGHT ELBOW PAIN: ICD-10-CM

## 2017-03-21 PROCEDURE — 25000003 PHARM REV CODE 250: Performed by: NURSE ANESTHETIST, CERTIFIED REGISTERED

## 2017-03-21 PROCEDURE — 36000709 HC OR TIME LEV III EA ADD 15 MIN: Performed by: ORTHOPAEDIC SURGERY

## 2017-03-21 PROCEDURE — 63600175 PHARM REV CODE 636 W HCPCS: Performed by: NURSE ANESTHETIST, CERTIFIED REGISTERED

## 2017-03-21 PROCEDURE — 63600175 PHARM REV CODE 636 W HCPCS: Performed by: SPECIALIST

## 2017-03-21 PROCEDURE — 71000033 HC RECOVERY, INTIAL HOUR: Performed by: ORTHOPAEDIC SURGERY

## 2017-03-21 PROCEDURE — 36000708 HC OR TIME LEV III 1ST 15 MIN: Performed by: ORTHOPAEDIC SURGERY

## 2017-03-21 PROCEDURE — 25000003 PHARM REV CODE 250: Performed by: PHYSICIAN ASSISTANT

## 2017-03-21 PROCEDURE — 37000009 HC ANESTHESIA EA ADD 15 MINS: Performed by: ORTHOPAEDIC SURGERY

## 2017-03-21 PROCEDURE — 24340 TENODESIS BICEPS TDN AT ELBW: CPT | Mod: RT,,, | Performed by: ORTHOPAEDIC SURGERY

## 2017-03-21 PROCEDURE — 25000003 PHARM REV CODE 250: Performed by: SPECIALIST

## 2017-03-21 PROCEDURE — 25000003 PHARM REV CODE 250: Performed by: ANESTHESIOLOGY

## 2017-03-21 PROCEDURE — 71000015 HC POSTOP RECOV 1ST HR: Performed by: ORTHOPAEDIC SURGERY

## 2017-03-21 PROCEDURE — 71000016 HC POSTOP RECOV ADDL HR: Performed by: ORTHOPAEDIC SURGERY

## 2017-03-21 PROCEDURE — C1713 ANCHOR/SCREW BN/BN,TIS/BN: HCPCS | Performed by: ORTHOPAEDIC SURGERY

## 2017-03-21 PROCEDURE — 37000008 HC ANESTHESIA 1ST 15 MINUTES: Performed by: ORTHOPAEDIC SURGERY

## 2017-03-21 DEVICE — BUTTON BICEPS DISTAL STERILE: Type: IMPLANTABLE DEVICE | Site: ELBOW | Status: FUNCTIONAL

## 2017-03-21 RX ORDER — SODIUM CHLORIDE 0.9 % (FLUSH) 0.9 %
3 SYRINGE (ML) INJECTION
Status: DISCONTINUED | OUTPATIENT
Start: 2017-03-21 | End: 2017-03-21 | Stop reason: HOSPADM

## 2017-03-21 RX ORDER — MIDAZOLAM HYDROCHLORIDE 1 MG/ML
2 INJECTION INTRAMUSCULAR; INTRAVENOUS ONCE
Status: COMPLETED | OUTPATIENT
Start: 2017-03-21 | End: 2017-03-21

## 2017-03-21 RX ORDER — FENTANYL CITRATE 50 UG/ML
100 INJECTION, SOLUTION INTRAMUSCULAR; INTRAVENOUS EVERY 5 MIN PRN
Status: COMPLETED | OUTPATIENT
Start: 2017-03-21 | End: 2017-03-21

## 2017-03-21 RX ORDER — OXYCODONE HYDROCHLORIDE 5 MG/1
5 TABLET ORAL
Status: DISCONTINUED | OUTPATIENT
Start: 2017-03-21 | End: 2017-03-21 | Stop reason: HOSPADM

## 2017-03-21 RX ORDER — DIPHENHYDRAMINE HYDROCHLORIDE 50 MG/ML
25 INJECTION INTRAMUSCULAR; INTRAVENOUS EVERY 6 HOURS PRN
Status: DISCONTINUED | OUTPATIENT
Start: 2017-03-21 | End: 2017-03-21 | Stop reason: HOSPADM

## 2017-03-21 RX ORDER — PROMETHAZINE HYDROCHLORIDE 25 MG/1
25 TABLET ORAL EVERY 6 HOURS PRN
Status: DISCONTINUED | OUTPATIENT
Start: 2017-03-21 | End: 2017-03-21 | Stop reason: HOSPADM

## 2017-03-21 RX ORDER — ONDANSETRON 2 MG/ML
4 INJECTION INTRAMUSCULAR; INTRAVENOUS ONCE AS NEEDED
Status: DISCONTINUED | OUTPATIENT
Start: 2017-03-21 | End: 2017-03-21 | Stop reason: SDUPTHER

## 2017-03-21 RX ORDER — MEPERIDINE HYDROCHLORIDE 50 MG/ML
12.5 INJECTION INTRAMUSCULAR; INTRAVENOUS; SUBCUTANEOUS ONCE AS NEEDED
Status: DISCONTINUED | OUTPATIENT
Start: 2017-03-21 | End: 2017-03-21 | Stop reason: SDUPTHER

## 2017-03-21 RX ORDER — LIDOCAINE HCL/PF 100 MG/5ML
SYRINGE (ML) INTRAVENOUS
Status: DISCONTINUED | OUTPATIENT
Start: 2017-03-21 | End: 2017-03-21

## 2017-03-21 RX ORDER — GLYCOPYRROLATE 0.2 MG/ML
INJECTION INTRAMUSCULAR; INTRAVENOUS
Status: DISCONTINUED | OUTPATIENT
Start: 2017-03-21 | End: 2017-03-21

## 2017-03-21 RX ORDER — ONDANSETRON 2 MG/ML
4 INJECTION INTRAMUSCULAR; INTRAVENOUS EVERY 12 HOURS PRN
Status: DISCONTINUED | OUTPATIENT
Start: 2017-03-21 | End: 2017-03-21 | Stop reason: HOSPADM

## 2017-03-21 RX ORDER — FENTANYL CITRATE 50 UG/ML
25 INJECTION, SOLUTION INTRAMUSCULAR; INTRAVENOUS EVERY 5 MIN PRN
Status: DISCONTINUED | OUTPATIENT
Start: 2017-03-21 | End: 2017-03-21 | Stop reason: HOSPADM

## 2017-03-21 RX ORDER — SODIUM CHLORIDE 0.9 % (FLUSH) 0.9 %
3 SYRINGE (ML) INJECTION EVERY 8 HOURS
Status: DISCONTINUED | OUTPATIENT
Start: 2017-03-21 | End: 2017-03-21 | Stop reason: HOSPADM

## 2017-03-21 RX ORDER — ONDANSETRON 2 MG/ML
INJECTION INTRAMUSCULAR; INTRAVENOUS
Status: DISCONTINUED | OUTPATIENT
Start: 2017-03-21 | End: 2017-03-21

## 2017-03-21 RX ORDER — CLINDAMYCIN PHOSPHATE 900 MG/50ML
900 INJECTION, SOLUTION INTRAVENOUS
Status: COMPLETED | OUTPATIENT
Start: 2017-03-21 | End: 2017-03-21

## 2017-03-21 RX ORDER — OXYCODONE HYDROCHLORIDE 5 MG/1
5 TABLET ORAL
Status: DISCONTINUED | OUTPATIENT
Start: 2017-03-21 | End: 2017-03-21 | Stop reason: SDUPTHER

## 2017-03-21 RX ORDER — OXYCODONE HCL 10 MG/1
10 TABLET, FILM COATED, EXTENDED RELEASE ORAL
Status: COMPLETED | OUTPATIENT
Start: 2017-03-21 | End: 2017-03-21

## 2017-03-21 RX ORDER — MEPERIDINE HYDROCHLORIDE 50 MG/ML
12.5 INJECTION INTRAMUSCULAR; INTRAVENOUS; SUBCUTANEOUS ONCE AS NEEDED
Status: DISCONTINUED | OUTPATIENT
Start: 2017-03-21 | End: 2017-03-21 | Stop reason: HOSPADM

## 2017-03-21 RX ORDER — FAMOTIDINE 20 MG/1
20 TABLET, FILM COATED ORAL
Status: COMPLETED | OUTPATIENT
Start: 2017-03-21 | End: 2017-03-21

## 2017-03-21 RX ORDER — HYDROMORPHONE HYDROCHLORIDE 2 MG/ML
0.4 INJECTION, SOLUTION INTRAMUSCULAR; INTRAVENOUS; SUBCUTANEOUS EVERY 5 MIN PRN
Status: DISCONTINUED | OUTPATIENT
Start: 2017-03-21 | End: 2017-03-21 | Stop reason: SDUPTHER

## 2017-03-21 RX ORDER — PROPOFOL 10 MG/ML
VIAL (ML) INTRAVENOUS
Status: DISCONTINUED | OUTPATIENT
Start: 2017-03-21 | End: 2017-03-21

## 2017-03-21 RX ORDER — PREGABALIN 75 MG/1
75 CAPSULE ORAL
Status: COMPLETED | OUTPATIENT
Start: 2017-03-21 | End: 2017-03-21

## 2017-03-21 RX ORDER — ROPIVACAINE HYDROCHLORIDE 5 MG/ML
INJECTION, SOLUTION EPIDURAL; INFILTRATION; PERINEURAL
Status: DISCONTINUED | OUTPATIENT
Start: 2017-03-21 | End: 2017-03-21

## 2017-03-21 RX ORDER — HYDROMORPHONE HYDROCHLORIDE 2 MG/ML
0.4 INJECTION, SOLUTION INTRAMUSCULAR; INTRAVENOUS; SUBCUTANEOUS EVERY 5 MIN PRN
Status: DISCONTINUED | OUTPATIENT
Start: 2017-03-21 | End: 2017-03-21 | Stop reason: HOSPADM

## 2017-03-21 RX ORDER — FENTANYL CITRATE 50 UG/ML
25 INJECTION, SOLUTION INTRAMUSCULAR; INTRAVENOUS EVERY 5 MIN PRN
Status: DISCONTINUED | OUTPATIENT
Start: 2017-03-21 | End: 2017-03-21 | Stop reason: SDUPTHER

## 2017-03-21 RX ORDER — ONDANSETRON 2 MG/ML
4 INJECTION INTRAMUSCULAR; INTRAVENOUS ONCE AS NEEDED
Status: DISCONTINUED | OUTPATIENT
Start: 2017-03-21 | End: 2017-03-21 | Stop reason: HOSPADM

## 2017-03-21 RX ORDER — MORPHINE SULFATE 10 MG/ML
2 INJECTION INTRAMUSCULAR; INTRAVENOUS; SUBCUTANEOUS EVERY 10 MIN PRN
Status: DISCONTINUED | OUTPATIENT
Start: 2017-03-21 | End: 2017-03-21 | Stop reason: HOSPADM

## 2017-03-21 RX ORDER — SODIUM CHLORIDE, SODIUM LACTATE, POTASSIUM CHLORIDE, CALCIUM CHLORIDE 600; 310; 30; 20 MG/100ML; MG/100ML; MG/100ML; MG/100ML
INJECTION, SOLUTION INTRAVENOUS CONTINUOUS PRN
Status: DISCONTINUED | OUTPATIENT
Start: 2017-03-21 | End: 2017-03-21

## 2017-03-21 RX ADMIN — FENTANYL CITRATE 50 MCG: 50 INJECTION, SOLUTION INTRAMUSCULAR; INTRAVENOUS at 08:03

## 2017-03-21 RX ADMIN — PREGABALIN 75 MG: 75 CAPSULE ORAL at 06:03

## 2017-03-21 RX ADMIN — LIDOCAINE HYDROCHLORIDE 25 MG: 20 INJECTION, SOLUTION INTRAVENOUS at 07:03

## 2017-03-21 RX ADMIN — ROPIVACAINE HYDROCHLORIDE 20 ML: 5 INJECTION, SOLUTION EPIDURAL; INFILTRATION; PERINEURAL at 06:03

## 2017-03-21 RX ADMIN — GLYCOPYRROLATE 0.2 MG: 0.2 INJECTION, SOLUTION INTRAMUSCULAR; INTRAVENOUS at 08:03

## 2017-03-21 RX ADMIN — CARBOXYMETHYLCELLULOSE SODIUM 2 DROP: 2.5 SOLUTION/ DROPS OPHTHALMIC at 07:03

## 2017-03-21 RX ADMIN — ONDANSETRON 4 MG: 2 INJECTION INTRAMUSCULAR; INTRAVENOUS at 09:03

## 2017-03-21 RX ADMIN — FENTANYL CITRATE 100 MCG: 50 INJECTION, SOLUTION INTRAMUSCULAR; INTRAVENOUS at 06:03

## 2017-03-21 RX ADMIN — OXYCODONE HYDROCHLORIDE 5 MG: 5 TABLET ORAL at 12:03

## 2017-03-21 RX ADMIN — MIDAZOLAM HYDROCHLORIDE 2 MG: 1 INJECTION, SOLUTION INTRAMUSCULAR; INTRAVENOUS at 06:03

## 2017-03-21 RX ADMIN — SODIUM CHLORIDE, SODIUM LACTATE, POTASSIUM CHLORIDE, AND CALCIUM CHLORIDE: 600; 310; 30; 20 INJECTION, SOLUTION INTRAVENOUS at 06:03

## 2017-03-21 RX ADMIN — PROMETHAZINE HYDROCHLORIDE 6.25 MG: 25 INJECTION INTRAMUSCULAR; INTRAVENOUS at 12:03

## 2017-03-21 RX ADMIN — CLINDAMYCIN PHOSPHATE 900 MG: 18 INJECTION, SOLUTION INTRAVENOUS at 08:03

## 2017-03-21 RX ADMIN — PROPOFOL 200 MG: 10 INJECTION, EMULSION INTRAVENOUS at 07:03

## 2017-03-21 RX ADMIN — FENTANYL CITRATE 100 MCG: 50 INJECTION, SOLUTION INTRAMUSCULAR; INTRAVENOUS at 07:03

## 2017-03-21 RX ADMIN — OXYCODONE HYDROCHLORIDE 10 MG: 10 TABLET, FILM COATED, EXTENDED RELEASE ORAL at 06:03

## 2017-03-21 RX ADMIN — FAMOTIDINE 20 MG: 20 TABLET, FILM COATED ORAL at 06:03

## 2017-03-21 RX ADMIN — FENTANYL CITRATE 50 MCG: 50 INJECTION, SOLUTION INTRAMUSCULAR; INTRAVENOUS at 09:03

## 2017-03-21 NOTE — ANESTHESIA PROCEDURE NOTES
Peripheral    Patient location during procedure: holding area   Block not for primary anesthetic.  Reason for block: at surgeon's request and post-op pain management   Post-op Pain Location: R Arm  Start time: 3/21/2017 6:59 AM  Timeout: 3/21/2017 6:59 AM   End time: 3/21/2017 7:05 AM  Surgery related to: Pain  Staffing  Anesthesiologist: SOULEYMANE BREWER  Performed by: anesthesiologist   Preanesthetic Checklist  Completed: patient identified, site marked, surgical consent, pre-op evaluation, timeout performed, IV checked, risks and benefits discussed and monitors and equipment checked  Peripheral Block  Patient position: supine  Prep: ChloraPrep and site prepped and draped  Patient monitoring: heart rate, cardiac monitor, continuous pulse ox and frequent blood pressure checks  Block type: interscalene  Laterality: right  Injection technique: single shot  Needle  Needle type: Stimuplex   Needle gauge: 21 G  Needle length: 3.5 in  Needle localization: anatomical landmarks, nerve stimulator and ultrasound guidance   -ultrasound image captured on disc.  Assessment  Injection assessment: negative aspiration, negative parasthesia and local visualized surrounding nerve  Paresthesia pain: none  Heart rate change: no  Slow fractionated injection: yes  Medications:  Bolus administered: 20 mL of 0.5 ropivacaine  Epinephrine added: none

## 2017-03-21 NOTE — DISCHARGE SUMMARY
[facility]  Brief Operative/ Discharge Summary Note      SUMMARY      Surgery Date: 3/21/2017      Surgeon(s) and Role:  * Mara Wilder MD - Primary     Assisting Surgeon: Peter Ford MD, PGY6     Pre-op Diagnosis: Rupture of tendon of biceps, long head, unspecified laterality, sequela [S46.119S]     Post-op Diagnosis: Post-Op Diagnosis Codes:  * Rupture of tendon of biceps, long head, unspecified laterality, sequela [S46.119S]     Procedure(s) (LRB):  REPAIR-TENDON-BICEP (Right)     Anesthesia: General     Description of the findings of the procedure: right distal biceps repair     Findings/Key Components: right distal biceps repair     Estimated Blood Loss: minimal      Specimens:       Specimen      None             Discharge Note     SUMMARY      Admit Date: 3/21/2017     Discharge Date and Time:  03/21/2017 9:23 AM     Hospital Course (synopsis of major diagnoses, care, treatment, and services provided during the course of the hospital stay): Patient underwent outpatient elbowsurgery and was transferred to PACU in stable condition. In PACU, patient received appropriate post-operative care and discharged home with plans for physical therapy and follow-up with the operative surgeon.     Diet: Regular     Final Diagnosis: Post-Op Diagnosis Codes:  * Rupture of tendon of biceps, long head, unspecified laterality, sequela [S46.119S]     Disposition: Home or Self Care     Follow Up/Patient Instructions:      Medications:  Reconciled Home Medications:         Current Discharge Medication List             CONTINUE these medications which have NOT CHANGED     Details   buPROPion (WELLBUTRIN XL) 150 MG TB24 tablet TK 1 T PO D  Refills: 1       psyllium (METAMUCIL) packet Take 1 packet by mouth once daily.       tramadol (ULTRAM) 50 mg tablet Take 1-2 tablets ( mg total) by mouth every 6 (six) hours as needed for Pain.  Qty: 60 tablet, Refills: 0     Associated Diagnoses: Rupture of distal biceps  tendon, right, initial encounter; Right elbow pain       acetaminophen (TYLENOL) 500 MG tablet Take 500 mg by mouth every 6 (six) hours as needed for Pain.       ALLEGRA-D 12 HOUR  mg per tablet Refills: 2       brompheniramine-pseudoeph-DM 2-30-10 mg/5 mL Syrp TK 1 TO 2 TEA PO Q 6 H PRN CNC  Refills: 5       LACTOBACILLUS ACIDOPHILUS (PROBIOTIC ORAL) Take by mouth once daily.       oxycodone-acetaminophen (PERCOCET)  mg per tablet Take 1 tablet by mouth every 4-6 hours as needed for pain. Take stool softener with this medication.  Qty: 60 tablet, Refills: 0     Associated Diagnoses: Rupture of distal biceps tendon, right, initial encounter; Right elbow pain       promethazine (PHENERGAN) 25 MG tablet Take 1 tablet (25 mg total) by mouth every 6 (six) hours as needed for Nausea.  Qty: 30 tablet, Refills: 0     Associated Diagnoses: Rupture of distal biceps tendon, right, initial encounter; Right elbow pain       rabeprazole (ACIPHEX) 20 mg tablet TK 1 T PO QD  Refills: 2       sulfacetamide sodium-sulfur 10-5 % (w/w) Clsr Refills: 1                STOP taking these medications         indomethacin (INDOCIN) 25 MG capsule Comments:   Reason for Stopping:            naproxen (NAPROSYN) 500 MG tablet Comments:   Reason for Stopping:                    Discharge Procedure Orders  Diet general      Diet general      Call MD for: temperature >100.4      Call MD for: persistent nausea and vomiting      Call MD for: severe uncontrolled pain      Call MD for: difficulty breathing, headache or visual disturbances      Call MD for: redness, tenderness, or signs of infection (pain, swelling, redness, odor or green/yellow discharge around incision site)      Call MD for: hives      Call MD for: persistent dizziness or light-headedness      Ice to affected area      Call MD for: temperature >100.4      Call MD for: persistent nausea and vomiting      Call MD for: severe uncontrolled pain      Call MD for: difficulty  breathing, headache or visual disturbances      Call MD for: redness, tenderness, or signs of infection (pain, swelling, redness, odor or green/yellow discharge around incision site)      Call MD for: hives      Call MD for: persistent dizziness or light-headedness      Keep surgical extremity elevated          Other restrictions (specify):   Order Comments: Leave splint in place until your clinic visit. Do not remove splint or get it wet. No lifting with the surgical extremity.      Leave dressing on - Keep it clean, dry, and intact until clinic visit

## 2017-03-21 NOTE — H&P (VIEW-ONLY)
Noel Sage Jr.  is here for a completion of his perioperative paperwork. he  Is scheduled to undergo     Right Distal short head biceps repair +/- tendon allograft assistance on 3/21/18.      He is a healthy individual and does need clearance for this procedure Which he got from Dr. Espinoza.     Risks, indications and benefits of the surgical procedure were discussed with the patient. All questions with regard to surgery, rehab, expected return to functional activities, activities of daily living and recreational endeavors were answered to his satisfaction.    Once no other questions were asked, a brief history and physical exam was then performed.      PHYSICAL EXAM:  GEN: A&Ox3, WD WN NAD  HEENT: WNL  CHEST: CTAB, no W/R/R  HEART: RRR, no M/R/G  ABD: Soft, NT ND, BS x4 QUADS  MS; See Epic  NEURO: CN II-XII intact       The surgical consent was then reviewed with the patient, who agreed with all the contents of the consent form and it was signed. he was then given the [facility] surgery packet to bring with him to [facility] for the anesthesia portion of his perioperative paperwork.   For all physicians except for Dr. Grigsby, we will email and possibly fax the consent forms and [facility]    PHYSICAL THERAPY:  He was also instructed regarding physical therapy and will begin on  TBD date. He was given a copy of the original prescription to schedule. Another copy of this prescription was also faxed to [facility].    POST OP CARE:instructions were reviewed including care of the wound and dressing after surgery and when he can shower.     PAIN MANAGEMENT: Noel Sage Jr. was also given his pain management regime, which includes the TENS unit given to him by umesh along with the education required for its use. He was also instructed regarding the Polar ice unit that will be in place after surgery and his postoperative pain medications.     PAIN MEDICATION:  Percocet  10/325mg 1 po q 4-6 hours prn pain  Ultram 50 mg one p.o. q.4-6 hours p.r.n. breakthrough pain,   Phenergan 25 mg one p.o. q.4-6 hours p.r.n. nausea and vomiting.  Indocin 75mg po x 4 days  Naproxen 500mg po BID x 21 days (starting on POD5)  aciphex as already taking    As there were no other questions to be asked, he was given my business card along with Mara Wilder MD business card if he has any questions or concerns prior to surgery or in the postop period.

## 2017-03-21 NOTE — BRIEF OP NOTE
[facility]  Brief Operative/ Discharge Summary Note     SUMMARY     Surgery Date: 3/21/2017     Surgeon(s) and Role:     * Mara Wilder MD - Primary    Assisting Surgeon: Peter Ford MD, PGY6    Pre-op Diagnosis:  Rupture of tendon of biceps, long head, unspecified laterality, sequela [S46.119S]    Post-op Diagnosis:  Post-Op Diagnosis Codes:     * Rupture of tendon of biceps, long head, unspecified laterality, sequela [S46.119S]    Procedure(s) (LRB):  REPAIR-TENDON-BICEP (Right)    Anesthesia: General    Description of the findings of the procedure: right distal biceps repair    Findings/Key Components: right distal biceps repair    Estimated Blood Loss: minimal         Specimens:   Specimen     None          Discharge Note    SUMMARY     Admit Date: 3/21/2017    Discharge Date and Time:  03/21/2017 9:23 AM    Hospital Course (synopsis of major diagnoses, care, treatment, and services provided during the course of the hospital stay): Patient underwent outpatient elbowsurgery and was transferred to PACU in stable condition.  In PACU, patient received appropriate post-operative care and discharged home with plans for physical therapy and follow-up with the operative surgeon.    Diet: Regular       Final Diagnosis: Post-Op Diagnosis Codes:     * Rupture of tendon of biceps, long head, unspecified laterality, sequela [S46.119S]    Disposition: Home or Self Care    Follow Up/Patient Instructions:     Medications:  Reconciled Home Medications:   Current Discharge Medication List      CONTINUE these medications which have NOT CHANGED    Details   buPROPion (WELLBUTRIN XL) 150 MG TB24 tablet TK 1 T PO D  Refills: 1      psyllium (METAMUCIL) packet Take 1 packet by mouth once daily.      tramadol (ULTRAM) 50 mg tablet Take 1-2 tablets ( mg total) by mouth every 6 (six) hours as needed for Pain.  Qty: 60 tablet, Refills: 0    Associated Diagnoses: Rupture of distal biceps tendon, right, initial  encounter; Right elbow pain      acetaminophen (TYLENOL) 500 MG tablet Take 500 mg by mouth every 6 (six) hours as needed for Pain.      ALLEGRA-D 12 HOUR  mg per tablet Refills: 2      brompheniramine-pseudoeph-DM 2-30-10 mg/5 mL Syrp TK 1 TO 2 TEA PO Q 6 H PRN CNC  Refills: 5      LACTOBACILLUS ACIDOPHILUS (PROBIOTIC ORAL) Take by mouth once daily.      oxycodone-acetaminophen (PERCOCET)  mg per tablet Take 1 tablet by mouth every 4-6 hours as needed for pain. Take stool softener with this medication.  Qty: 60 tablet, Refills: 0    Associated Diagnoses: Rupture of distal biceps tendon, right, initial encounter; Right elbow pain      promethazine (PHENERGAN) 25 MG tablet Take 1 tablet (25 mg total) by mouth every 6 (six) hours as needed for Nausea.  Qty: 30 tablet, Refills: 0    Associated Diagnoses: Rupture of distal biceps tendon, right, initial encounter; Right elbow pain      rabeprazole (ACIPHEX) 20 mg tablet TK 1 T PO QD  Refills: 2      sulfacetamide sodium-sulfur 10-5 % (w/w) Clsr Refills: 1         STOP taking these medications       indomethacin (INDOCIN) 25 MG capsule Comments:   Reason for Stopping:         naproxen (NAPROSYN) 500 MG tablet Comments:   Reason for Stopping:               Discharge Procedure Orders  Diet general     Diet general     Call MD for:  temperature >100.4     Call MD for:  persistent nausea and vomiting     Call MD for:  severe uncontrolled pain     Call MD for:  difficulty breathing, headache or visual disturbances     Call MD for:  redness, tenderness, or signs of infection (pain, swelling, redness, odor or green/yellow discharge around incision site)     Call MD for:  hives     Call MD for:  persistent dizziness or light-headedness     Ice to affected area     Call MD for:  temperature >100.4     Call MD for:  persistent nausea and vomiting     Call MD for:  severe uncontrolled pain     Call MD for:  difficulty breathing, headache or visual disturbances     Call  MD for:  redness, tenderness, or signs of infection (pain, swelling, redness, odor or green/yellow discharge around incision site)     Call MD for:  hives     Call MD for:  persistent dizziness or light-headedness     Keep surgical extremity elevated     Other restrictions (specify):   Order Comments: Leave splint in place until your clinic visit. Do not remove splint or get it wet. No lifting with the surgical extremity.     Leave dressing on - Keep it clean, dry, and intact until clinic visit

## 2017-03-21 NOTE — TRANSFER OF CARE
"Anesthesia Transfer of Care Note    Patient: Noel Sage Jr.    Procedure(s) Performed: Procedure(s) (LRB):  REPAIR-TENDON-BICEP (Right)    Patient location: PACU    Anesthesia Type: general    Transport from OR: Transported from OR on 2-3 L/min O2 by NC with adequate spontaneous ventilation    Post pain: adequate analgesia    Post assessment: no apparent anesthetic complications    Post vital signs: stable    Level of consciousness: responds to stimulation    Nausea/Vomiting: no nausea/vomiting    Complications: none          Last vitals:   Visit Vitals    BP (!) 142/85 (BP Location: Left arm, Patient Position: Lying, BP Method: Automatic)    Pulse 95    Temp 37 °C (98.6 °F) (Oral)    Resp 16    Ht 5' 10" (1.778 m)    Wt 85.3 kg (188 lb)    SpO2 97%    BMI 26.98 kg/m2     "

## 2017-03-21 NOTE — IP AVS SNAPSHOT
[facility] ([facility])  [address] [place] [id number]  Phone: [phone]           Patient Discharge Instructions     Our goal is to set you up for success. This packet includes information on your condition, medications, and your home care. It will help you to care for yourself so you don't get sicker and need to go back to the hospital.     Please ask your nurse if you have any questions.        There are many details to remember when preparing to leave the hospital. Here is what you will need to do:    1. Take your medicine. If you are prescribed medications, review your Medication List in the following pages. You may have new medications to  at the pharmacy and others that you'll need to stop taking. Review the instructions for how and when to take your medications. Talk with your doctor or nurses if you are unsure of what to do.     2. Go to your follow-up appointments. Specific follow-up information is listed in the following pages. Your may be contacted by a transition nurse or clinical provider about future appointments. Be sure we have all of the phone numbers to reach you, if needed. Please contact your provider's office if you are unable to make an appointment.     3. Watch for warning signs. Your doctor or nurse will give you detailed warning signs to watch for and when to call for assistance. These instructions may also include educational information about your condition. If you experience any of warning signs to your health, call your doctor.               ** Verify the list of medication(s) below is accurate and up to date. Carry this with you in case of emergency. If your medications have changed, please notify your healthcare provider.             Medication List      CONTINUE taking these medications        Additional Info                      acetaminophen 500 MG tablet   Commonly known as:  TYLENOL   Refills:  0   Dose:  500 mg    Instructions:  Take 500 mg by  mouth every 6 (six) hours as needed for Pain.     Begin Date    AM    Noon    PM    Bedtime       ALLEGRA-D 12 HOUR  mg per tablet   Refills:  2   Generic drug:  fexofenadine-pseudoephedrine  mg      Begin Date    AM    Noon    PM    Bedtime       brompheniramine-pseudoeph-DM 2-30-10 mg/5 mL Syrp   Refills:  5    Instructions:  TK 1 TO 2 TEA PO Q 6 H PRN CNC     Begin Date    AM    Noon    PM    Bedtime       buPROPion 150 MG TB24 tablet   Commonly known as:  WELLBUTRIN XL   Refills:  1    Instructions:  TK 1 T PO D     Begin Date    AM    Noon    PM    Bedtime       oxycodone-acetaminophen  mg per tablet   Commonly known as:  PERCOCET   Quantity:  60 tablet   Refills:  0    Instructions:  Take 1 tablet by mouth every 4-6 hours as needed for pain. Take stool softener with this medication.     Begin Date    AM    Noon    PM    Bedtime       PROBIOTIC ORAL   Refills:  0    Instructions:  Take by mouth once daily.     Begin Date    AM    Noon    PM    Bedtime       promethazine 25 MG tablet   Commonly known as:  PHENERGAN   Quantity:  30 tablet   Refills:  0   Dose:  25 mg    Instructions:  Take 1 tablet (25 mg total) by mouth every 6 (six) hours as needed for Nausea.     Begin Date    AM    Noon    PM    Bedtime       psyllium packet   Commonly known as:  METAMUCIL   Refills:  0   Dose:  1 packet    Instructions:  Take 1 packet by mouth once daily.     Begin Date    AM    Noon    PM    Bedtime       rabeprazole 20 mg tablet   Commonly known as:  ACIPHEX   Refills:  2    Instructions:  TK 1 T PO QD     Begin Date    AM    Noon    PM    Bedtime       sulfacetamide sodium-sulfur 10-5 % (w/w) Clsr   Refills:  1      Begin Date    AM    Noon    PM    Bedtime       tramadol 50 mg tablet   Commonly known as:  ULTRAM   Quantity:  60 tablet   Refills:  0   Dose:   mg    Instructions:  Take 1-2 tablets ( mg total) by mouth every 6 (six) hours as needed for Pain.     Begin Date    AM    Noon    PM     Bedtime         STOP taking these medications     indomethacin 25 MG capsule   Commonly known as:  INDOCIN       naproxen 500 MG tablet   Commonly known as:  NAPROSYN                  Please bring to all follow up appointments:    1. A copy of your discharge instructions.  2. All medicines you are currently taking in their original bottles.  3. Identification and insurance card.    Please arrive 15 minutes ahead of scheduled appointment time.    Please call 24 hours in advance if you must reschedule your appointment and/or time.        Your Scheduled Appointments     Mar 31, 2017 10:30 AM CDT   Post OP with Mara Wilder MD   [facility]    [address] [place] [address]  [facility] [address]   [phone]                Discharge Instructions     Future Orders    Call MD for:  difficulty breathing, headache or visual disturbances     Call MD for:  difficulty breathing, headache or visual disturbances     Call MD for:  hives     Call MD for:  hives     Call MD for:  persistent dizziness or light-headedness     Call MD for:  persistent dizziness or light-headedness     Call MD for:  persistent nausea and vomiting     Call MD for:  persistent nausea and vomiting     Call MD for:  redness, tenderness, or signs of infection (pain, swelling, redness, odor or green/yellow discharge around incision site)     Call MD for:  redness, tenderness, or signs of infection (pain, swelling, redness, odor or green/yellow discharge around incision site)     Call MD for:  severe uncontrolled pain     Call MD for:  severe uncontrolled pain     Call MD for:  temperature >100.4     Call MD for:  temperature >100.4     Diet general     Questions:    Total calories:      Fat restriction, if any:      Protein restriction, if any:      Na restriction, if any:      Fluid restriction:      Additional restrictions:      Diet general     Questions:    Total calories:      Fat restriction, if any:      Protein restriction, if  any:      Na restriction, if any:      Fluid restriction:      Additional restrictions:      Keep surgical extremity elevated     Leave dressing on - Keep it clean, dry, and intact until clinic visit     Other restrictions (specify):     Comments:    Leave splint in place until your clinic visit. Do not remove splint or get it wet. No lifting with the surgical extremity.        Discharge Instructions         Anesthesia: After Your Surgery  Youve just had surgery. During surgery, you received medication called anesthesia to keep you comfortable and pain-free. After surgery, you may experience some pain or nausea. This is common. Here are some tips for feeling better and recovering after surgery.    Going home  Your doctor or nurse will show you how to take care of yourself when you go home. He or she will also answer your questions. Have an adult family member or friend drive you home. For the first 24 hours after your surgery:  · Do not drive or use heavy equipment.  · Do not make important decisions or sign legal documents.  · Avoid alcohol.  · Have someone stay with you, if needed. He or she can watch for problems and help keep you safe.  Be sure to keep all follow-up appointments with your doctor. And rest after your procedure for as long as your doctor tells you to.    Coping with pain  If you have pain after surgery, pain medication will help you feel better. Take it as directed, before pain becomes severe. Also, ask your doctor or pharmacist about other ways to control pain, such as with heat, ice, and relaxation. And follow any other instructions your surgeon or nurse gives you.    Tips for taking pain medication  To get the best relief possible, remember these points:  · Pain medications can upset your stomach. Taking them with a little food may help.  · Most pain relievers taken by mouth need at least 20 to 30 minutes to take effect.  · Taking medication on a schedule can help you remember to take it. Try  to time your medication so that you can take it before beginning an activity, such as dressing, walking, or sitting down for dinner.  · Constipation is a common side effect of pain medications. Contact your doctor before taking any medications like laxatives or stool softeners to help relieve constipation. Also ask about any dietary restrictions, because drinking lots of fluids and eating foods like fruits and vegetables that are high in fiber can also help. Remember, dont take laxatives unless your surgeon has prescribed them.  · Mixing alcohol and pain medication can cause dizziness and slow your breathing. It can even be fatal. Dont drink alcohol while taking pain medication.  · Pain medication can slow your reflexes. Dont drive or operate machinery while taking pain medication.  If your health care provider tells you to take acetaminophen to help relieve your pain, ask him or her how much you are supposed to take each day. (Acetaminophen is the generic name for Tylenol and other brand-name pain relievers.) Acetaminophen or other pain relievers may interact with your prescription medicines or other over-the-counter (OTC) drugs. Some prescription medications contain acetaminophen along with other active ingredients. Using both prescription and OTC acetaminophen for pain can cause you to overdose. The FDA recommends that you read the labels on your OTC medications carefully. This will help you to clearly understand the list of active ingredients, dosing instructions, and any warnings. It may also help you avoid taking too much acetaminophen. If you have questions or don't understand the information, ask your pharmacist or health care provider to explain it to you before you take the OTC medication.    Managing nausea  Some people have an upset stomach after surgery. This is often due to anesthesia, pain, pain medications, or the stress of surgery. The following tips will help you manage nausea and get good  "nutrition as you recover. If you were on a special diet before surgery, ask your doctor if you should follow it during recovery. These tips may help:  · Dont push yourself to eat. Your body will tell you when to eat and how much.  · Start off with clear liquids and soup. They are easier to digest.  · Progress to semi-solid foods (mashed potatoes, applesauce, and gelatin) as you feel ready.  · Slowly move to solid foods. Dont eat fatty, rich, or spicy foods at first.  · Dont force yourself to have three large meals a day. Instead, eat smaller amounts more often.  · Take pain medications with a small amount of solid food, such as crackers or toast to avoid nausea.      Call your surgeon if  · You still have pain an hour after taking medication (it may not be strong enough).  · You feel too sleepy, dizzy, or groggy (medication may be too strong).  · You have side effects like nausea, vomiting, or skin changes (rash, itching, or hives).   © 9262-6744 [employer]. [address] [place] [address]. All rights reserved. This information is not intended as a substitute for professional medical care. Always follow your healthcare professional's instructions.                      Admission Information     Date & Time Provider Department CSN    3/21/2017  5:32 AM Mara Wilder MD [facility] [id number]      Care Providers     Provider Role Specialty Primary office phone    Mara Wilder MD Attending Provider Sports Medicine [phone]    Mara Wilder MD Surgeon  Sports Medicine [phone]      Your Vitals Were     BP Pulse Temp Resp Height Weight    143/83 91 98.5 °F (36.9 °C) (Oral) 16 5' 10" (1.778 m) 85.3 kg (188 lb)    SpO2 BMI             97% 26.98 kg/m2         Recent Lab Values     No lab values to display.      Allergies as of 3/21/2017        Reactions    Penicillins     UNKNOWN      [facility]     [facility] Nurse Care Line - 24/7 Assistance  Unless otherwise " directed by your provider, please contact [facility], our nurse care line that is available for 24/7 assistance.     Registered nurses in the [facility] provide clinical advisement, health education, appointment booking, and other advisory services.  Call for this free service at [phone].        Advance Directives     An advance directive is a document which, in the event you are no longer able to make decisions for yourself, tells your healthcare team what kind of treatment you do or do not want to receive, or who you would like to make those decisions for you.  If you do not currently have an advance directive, [facility] encourages you to create one.  For more information call:  [phone]), [phone] ([phone]),  or log on to [email or web address].        Smoking Cessation     If you would like to quit smoking:   You may be eligible for free services if you are a [place] resident and started smoking cigarettes before September 1, 1988.  Call the Smoking Cessation Trust (SCT) toll free at (369) 003-6873 or (479) 292-9926.   Call 9-143-QUIT-NOW if you do not meet the above criteria.            Language Assistance Services     ATTENTION: Language assistance services are available, free of charge. Please call [phone].      ATENCIÓN: Si habla español, tiene a james disposición servicios gratuitos de asistencia lingüística. Llame al [phone].     CHÚ Ý: N?u b?n nói Ti?ng Vi?t, có các d?ch v? h? tr? ngôn ng? mi?n phí dành cho b?n. G?i s? [phone].        [facility] Sign-Up     Activating your [facility] account is as easy as 1-2-3!     1) Visit [email or web address], select Sign Up Now, enter this activation code and your date of birth, then select Next.  [email or web address]  Expires: 4/30/2017 12:50 PM      2) Create a username and password to use when you visit [facility] in the future and select a security question in case you lose your password and select  Next.    3) Enter your e-mail address and click Sign Up!    Additional Information  If you have questions, please e-mail [email or web address] or call [phone] to talk to our [facility] staff. Remember, [facility] is NOT to be used for urgent needs. For medical emergencies, dial 911.          [facility] complies with applicable Federal civil rights laws and does not discriminate on the basis of race, color, national origin, age, disability, or sex.

## 2017-03-21 NOTE — PLAN OF CARE
Noel Sage Jr. has met all discharge criteria from Phase II. Vital Signs are stable, ambulating  without difficulty. Nausea has now subsided. Discharge instructions given, patient verbalized understanding. Discharged from facility via wheelchair in stable condition.

## 2017-03-21 NOTE — DISCHARGE INSTRUCTIONS
Anesthesia: After Your Surgery  Youve just had surgery. During surgery, you received medication called anesthesia to keep you comfortable and pain-free. After surgery, you may experience some pain or nausea. This is common. Here are some tips for feeling better and recovering after surgery.    Going home  Your doctor or nurse will show you how to take care of yourself when you go home. He or she will also answer your questions. Have an adult family member or friend drive you home. For the first 24 hours after your surgery:  · Do not drive or use heavy equipment.  · Do not make important decisions or sign legal documents.  · Avoid alcohol.  · Have someone stay with you, if needed. He or she can watch for problems and help keep you safe.  Be sure to keep all follow-up appointments with your doctor. And rest after your procedure for as long as your doctor tells you to.    Coping with pain  If you have pain after surgery, pain medication will help you feel better. Take it as directed, before pain becomes severe. Also, ask your doctor or pharmacist about other ways to control pain, such as with heat, ice, and relaxation. And follow any other instructions your surgeon or nurse gives you.    Tips for taking pain medication  To get the best relief possible, remember these points:  · Pain medications can upset your stomach. Taking them with a little food may help.  · Most pain relievers taken by mouth need at least 20 to 30 minutes to take effect.  · Taking medication on a schedule can help you remember to take it. Try to time your medication so that you can take it before beginning an activity, such as dressing, walking, or sitting down for dinner.  · Constipation is a common side effect of pain medications. Contact your doctor before taking any medications like laxatives or stool softeners to help relieve constipation. Also ask about any dietary restrictions, because drinking lots of fluids and eating foods like fruits  and vegetables that are high in fiber can also help. Remember, dont take laxatives unless your surgeon has prescribed them.  · Mixing alcohol and pain medication can cause dizziness and slow your breathing. It can even be fatal. Dont drink alcohol while taking pain medication.  · Pain medication can slow your reflexes. Dont drive or operate machinery while taking pain medication.  If your health care provider tells you to take acetaminophen to help relieve your pain, ask him or her how much you are supposed to take each day. (Acetaminophen is the generic name for Tylenol and other brand-name pain relievers.) Acetaminophen or other pain relievers may interact with your prescription medicines or other over-the-counter (OTC) drugs. Some prescription medications contain acetaminophen along with other active ingredients. Using both prescription and OTC acetaminophen for pain can cause you to overdose. The FDA recommends that you read the labels on your OTC medications carefully. This will help you to clearly understand the list of active ingredients, dosing instructions, and any warnings. It may also help you avoid taking too much acetaminophen. If you have questions or don't understand the information, ask your pharmacist or health care provider to explain it to you before you take the OTC medication.    Managing nausea  Some people have an upset stomach after surgery. This is often due to anesthesia, pain, pain medications, or the stress of surgery. The following tips will help you manage nausea and get good nutrition as you recover. If you were on a special diet before surgery, ask your doctor if you should follow it during recovery. These tips may help:  · Dont push yourself to eat. Your body will tell you when to eat and how much.  · Start off with clear liquids and soup. They are easier to digest.  · Progress to semi-solid foods (mashed potatoes, applesauce, and gelatin) as you feel ready.  · Slowly move to  solid foods. Dont eat fatty, rich, or spicy foods at first.  · Dont force yourself to have three large meals a day. Instead, eat smaller amounts more often.  · Take pain medications with a small amount of solid food, such as crackers or toast to avoid nausea.      Call your surgeon if  · You still have pain an hour after taking medication (it may not be strong enough).  · You feel too sleepy, dizzy, or groggy (medication may be too strong).  · You have side effects like nausea, vomiting, or skin changes (rash, itching, or hives).   © 1250-4515 [employer]. [address] [place] [address], [place] [address]. All rights reserved. This information is not intended as a substitute for professional medical care. Always follow your healthcare professional's instructions.

## 2017-03-21 NOTE — OP NOTE
DATE OF PROCEDURE: 03/21/2017    SURGEON: Mara Wildre M.D.     FIRST ASSISTANT: CARROLL Ford M.D. (PGY-6)   ASSISTANT: SMA Lianna      PROCEDURE PERFORMED:  Open repair of Right distal biceps tendon.     PREOPERATIVE DIAGNOSIS: Right distal biceps rupture.     POSTOPERATIVE DIAGNOSIS: Right distal biceps rupture.     ANESTHESIA: General     ESTIMATED BLOOD LOSS: 20cc.     TOURNIQUET TIME: 57 minutes.     COMPLICATIONS: None.     BRIEF INDICATION OF MEDICAL NECESSITY: The patient is a 51 y.o. year-old male  who was seen in the office with a history with a history and physical examination findings consistent with the above. Nonoperative versus operative options were discussed. The risks and benefits were discussed with the patient. The patient acknowledged understanding and wished to proceed with operative intervention. Informed consent was obtained prior to the procedure. Details of the surgical procedure were explained, including incisions and probable rehabilitation course. The patient understands the likely length of convalescence after surgery; and we have explained the risks, benefits, and alternatives of surgery. Reasonable expectations and potential complications were discussed and acknowledged, including but not limited to infection, bleeding, blood clots, (DVT and/or PE), nerve injury, retear, instability, hardware complications, fracture, continued pain and stiffness, cardiopulmonary compromise and anesthetic complications. Also, risk of heterotopic ossification and neuropraxia were also explained. . It was also explained that there was a chance of failure which may require further management and procedure(s). The patient agreed and understood and wished to proceed.     PROCEDURE IN DETAIL: The correct operative site was marked and consents were verified, and patient was taken to the Operating Room and placed supine on the operating table. General endotracheal anesthesia was then applied via the  Anesthesia Team. All pressure points were well padded. A tourniquet was placed high on the operative arm. The operative upper extremity was prepped and draped in the usual sterile fashion.     Using Esmarch for exsanguination, the tourniquet was insufflated to 250 mmHg. Appropriate landmarks were noticed on the skin. A 3 cm transverse incision was made approximately 3cm distal to the antecubital crease. This was done in supination. The skin of subcutaneous tissue was dissected. The lacertus fibrosus was identified. Skin and subcutaneous tissues were dissected sharply. During dissection, the lateral antebrachial cutaneous nerve was identified and protected throughout the case. After dissection, biceps tendon stump was identified.  The normal pathway for the biceps was then manually traced back down to the biceps tuberosity on the radius. The remaining stump on the biceps tendon was dissected and cleared of all fibrotic or fatty tissue. The tendon was brought up to its normal length with an Allis clamp, then loop stitched the distal end of the biceps tendon using fiber loop suture.     The tract of the biceps tendon was then followed down to the level of the radial tuberosity. Pronating and supinating of the forearm was used to localize the radial tuberosity. The curved Laura clamps were then placed down through the antecubital fossa through the tracts of the biceps tendon along the radius with the arm in pronation. The curve was around radius through the supinator and extensor mass. It was palpated posteriorly.     A 6-cm incision was then made posteriorly along the length of the Laura clamp. The fascia was then incised in line with the incision. No Gela retractor was placed radially in order to protect the PIN.  PIN was identified and carefully protected this throughout the entire case while dividing muscle fibers down to the level of the radius.  Care was taken to not excessively retract near the PIN throughout  the case. The radial tuberosity was then cleared while the arm was in full pronation and flexion. Graft was sized to a 7mm. Guide pin was then placed in the appropriate position along the radial tuberosity and a 7mm mm drill was then used to go through the near cortex. Care was taken to preserve the far cortex. This was done in full pronation.  Copious and gentle irrigation was performed twice.  Anatomical repair of the distal biceps tendon was commenced using tension-slide technique. The biceps button was loaded, sutures and biceps button and attachment were intact. Button was placed through the hole through the floor of cortex, and flipped appropriately. The button was then visually inspected and palpated and indeed had flipped. No soft tissue was interposing. Tendon was then tensioned down into the socket tunnel as above. One of the sutures was then used to place a knot through the middle of the tendon and it was tied.  Irrigation was performed again.      Fixation was solid. Upon extending the elbow, the biceps became more tensioned at approximately 150 degrees. The patient had full pronation and supination. Both wounds were then thoroughly irrigated. Tourniquet was released and hemostasis was achieved using electrocautery. The lacertus fibrosus was allowed to lie on top of the biceps tendon. The fascia layer was closed. Subcutaneous tissue was then closed using 3-0 Vicryl interrupted suture. Monocryl 4-0 was then used subcuticular. The Steri-Strips were placed along with Xeroform, 4 x 4's, and cast padding and the posterior split at 90 degrees. TENS unit pads were placed which were medically necessary for pain relief.      The patient was then awoken from general endotracheal anesthesia and transported to the Recovery Room in good stable condition, with compartments soft and capillary refill less than a second in all digits.     POSTOPERATIVE CARE: Will keep him in his splint for approximately 10 days. At that  time, will take him out and begin dynamic splinting.

## 2017-03-21 NOTE — DISCHARGE SUMMARY
[facility]  Brief Operative/ Discharge summary Note     SUMMARY     Surgery Date: 3/21/2017     Surgeon(s) and Role:     * Mara Wilder MD - Primary    Assisting Surgeon: Peter Ford MD, PGY6    Pre-op Diagnosis:  Rupture of tendon of biceps, long head, unspecified laterality, sequela [S46.119S]    Post-op Diagnosis:  Post-Op Diagnosis Codes:     * Rupture of tendon of biceps, long head, unspecified laterality, sequela [S46.119S]    Procedure(s) (LRB):  REPAIR-TENDON-BICEP (Right)    Anesthesia: General    Description of the findings of the procedure: left shoulder arthroscopy    Findings/Key Components: left shoulder arthroscopy    Estimated Blood Loss: minimal         Specimens:   Specimen     None          Discharge Note    SUMMARY     Admit Date: 3/21/2017    Discharge Date and Time:  03/21/2017 9:12 AM    Hospital Course (synopsis of major diagnoses, care, treatment, and services provided during the course of the hospital stay): Patient underwent outpatient shoulder surgery and was transferred to PACU in stable condition.  In PACU, patient received appropriate post-operative care and discharged home with plans for physical therapy and follow-up with the operative surgeon.    Diet: Regular       Final Diagnosis: Post-Op Diagnosis Codes:     * Rupture of tendon of biceps, long head, unspecified laterality, sequela [S46.119S]    Disposition: Home or Self Care    Follow Up/Patient Instructions:     Medications:  Reconciled Home Medications:   Current Discharge Medication List      CONTINUE these medications which have NOT CHANGED    Details   buPROPion (WELLBUTRIN XL) 150 MG TB24 tablet TK 1 T PO D  Refills: 1      psyllium (METAMUCIL) packet Take 1 packet by mouth once daily.      tramadol (ULTRAM) 50 mg tablet Take 1-2 tablets ( mg total) by mouth every 6 (six) hours as needed for Pain.  Qty: 60 tablet, Refills: 0    Associated Diagnoses: Rupture of distal biceps tendon, right, initial  encounter; Right elbow pain      acetaminophen (TYLENOL) 500 MG tablet Take 500 mg by mouth every 6 (six) hours as needed for Pain.      ALLEGRA-D 12 HOUR  mg per tablet Refills: 2      brompheniramine-pseudoeph-DM 2-30-10 mg/5 mL Syrp TK 1 TO 2 TEA PO Q 6 H PRN CNC  Refills: 5      LACTOBACILLUS ACIDOPHILUS (PROBIOTIC ORAL) Take by mouth once daily.      oxycodone-acetaminophen (PERCOCET)  mg per tablet Take 1 tablet by mouth every 4-6 hours as needed for pain. Take stool softener with this medication.  Qty: 60 tablet, Refills: 0    Associated Diagnoses: Rupture of distal biceps tendon, right, initial encounter; Right elbow pain      promethazine (PHENERGAN) 25 MG tablet Take 1 tablet (25 mg total) by mouth every 6 (six) hours as needed for Nausea.  Qty: 30 tablet, Refills: 0    Associated Diagnoses: Rupture of distal biceps tendon, right, initial encounter; Right elbow pain      rabeprazole (ACIPHEX) 20 mg tablet TK 1 T PO QD  Refills: 2      sulfacetamide sodium-sulfur 10-5 % (w/w) Clsr Refills: 1         STOP taking these medications       indomethacin (INDOCIN) 25 MG capsule Comments:   Reason for Stopping:         naproxen (NAPROSYN) 500 MG tablet Comments:   Reason for Stopping:               Discharge Procedure Orders  Diet general     Call MD for:  temperature >100.4     Call MD for:  persistent nausea and vomiting     Call MD for:  severe uncontrolled pain     Call MD for:  difficulty breathing, headache or visual disturbances     Call MD for:  redness, tenderness, or signs of infection (pain, swelling, redness, odor or green/yellow discharge around incision site)     Call MD for:  hives     Call MD for:  persistent dizziness or light-headedness     Ice to affected area     Shower on day dressing removed (No bath)     Other restrictions (specify):   Order Comments: Wear sling for 2 weeks. Start physical therapy in 5-7 days. No lifting greater than 1 pound with surgical extremity.     Remove  dressing in 72 hours

## 2017-03-21 NOTE — ANESTHESIA POSTPROCEDURE EVALUATION
"Anesthesia Post Evaluation    Patient: Noel Sage Jr.    Procedure(s) Performed: Procedure(s) (LRB):  REPAIR-TENDON-BICEP (Right)    Final Anesthesia Type: general  Patient location during evaluation: PACU  Patient participation: Yes- Able to Participate  Level of consciousness: awake and alert and oriented  Post-procedure vital signs: reviewed and stable  Pain management: adequate  Airway patency: patent  PONV status at discharge: No PONV  Anesthetic complications: no      Cardiovascular status: blood pressure returned to baseline and hemodynamically stable  Respiratory status: unassisted, spontaneous ventilation and room air  Hydration status: euvolemic  Follow-up not needed.        Visit Vitals    BP (!) 143/83    Pulse 91    Temp 36.9 °C (98.5 °F) (Oral)    Resp 16    Ht 5' 10" (1.778 m)    Wt 85.3 kg (188 lb)    SpO2 97%    BMI 26.98 kg/m2       Pain/Lisa Score: Pain Assessment Performed: Yes (3/21/2017 10:45 AM)  Presence of Pain: denies (3/21/2017 10:45 AM)  Pain Rating Prior to Med Admin: 2 (3/21/2017  6:15 AM)  Lisa Score: 10 (3/21/2017 10:45 AM)      "

## 2017-03-23 ENCOUNTER — TELEPHONE (OUTPATIENT)
Dept: SPORTS MEDICINE | Facility: CLINIC | Age: 52
End: 2017-03-23

## 2017-03-23 NOTE — TELEPHONE ENCOUNTER
----- Message from Melanie Nance sent at 3/23/2017  1:10 PM CDT -----  Contact: Ms. Sage/wife  Ms. Sage would like to speak with you regarding rescheduling the pts post op appt to an earlier date. Pt can be reached at [phone].

## 2017-03-31 ENCOUNTER — OFFICE VISIT (OUTPATIENT)
Dept: SPORTS MEDICINE | Facility: CLINIC | Age: 52
End: 2017-03-31
Payer: COMMERCIAL

## 2017-03-31 VITALS
BODY MASS INDEX: 26.92 KG/M2 | TEMPERATURE: 98 F | RESPIRATION RATE: 20 BRPM | WEIGHT: 188 LBS | SYSTOLIC BLOOD PRESSURE: 145 MMHG | DIASTOLIC BLOOD PRESSURE: 88 MMHG | HEIGHT: 70 IN | HEART RATE: 88 BPM

## 2017-03-31 DIAGNOSIS — S46.211S BICEPS MUSCLE TEAR, RIGHT, SEQUELA: Primary | ICD-10-CM

## 2017-03-31 PROCEDURE — 99999 PR PBB SHADOW E&M-EST. PATIENT-LVL III: CPT | Mod: PBBFAC,,, | Performed by: ORTHOPAEDIC SURGERY

## 2017-03-31 PROCEDURE — 99024 POSTOP FOLLOW-UP VISIT: CPT | Mod: S$GLB,,, | Performed by: ORTHOPAEDIC SURGERY

## 2017-03-31 NOTE — PROGRESS NOTES
CC: Right elbow pain    HPI: The patient presents to clinic status post his distal bicep tendon repair. He notes he is doing well. We have reviewed his findings and discussed plan of care and future treatment options.     DATE OF PROCEDURE: 03/21/2017  PROCEDURE PERFORMED:  Open repair of Right distal biceps tendon.     He only notes some discomfort  Pain today is 0/10  He arrived today with his splint altered slightly as he notes that he had a panic attack and felt like his arm needed to breathe   He notes numbness in his forearm on the ulnar aspect, he notes it was hard to determine if it has improved due to the splint                                                                               PHYSICAL EXAMINATION:     Incision sites healed well  Sutures removed today  No evidence of any erythema, infection or induration  Range of motion  2+ DP pulse  Mild swelling  Decreased sensation in ulnar distribution in forearm and thumb                                                                              ASSESSMENT:                                                                                                                                               1. Status post above, doing well.                                                                                                                               PLAN:                                                                                                                                                     1. Begin physical therapy first thing next week with [place] or [place]  2. Patient was placed in a hinged elbow brace, he will have motion restrictions, only from 90 degrees to full flexion  3. I have discussed return to activity in detail.  4. he will see us back in 4 weeks.                                      5. All questions were answered and he should contact us if he  has any questions or concerns in the interim.

## 2017-03-31 NOTE — MR AVS SNAPSHOT
[facility]  [address]  [facility] [address]  Phone: [phone]                  Noel Sage JrOpal   3/31/2017 7:15 AM   Appointment    Description:  Male : 1965   Provider:  Mara Wilder MD   Department:  [facility]                To Do List           Future Appointments        Provider Department Dept Phone    3/31/2017 7:15 AM Mara Wilder MD [facility] [phone]      Goals (5 Years of Data)     None      [facility]     [facility] On Call Nurse Care Line -  Assistance  Unless otherwise directed by your provider, please contact [facility] On-Call, our nurse care line that is available for  assistance.     Registered nurses in the [facility] On Call Center provide: appointment scheduling, clinical advisement, health education, and other advisory services.  Call: [phone] (toll free)               Medications                Verify that the below list of medications is an accurate representation of the medications you are currently taking.  If none reported, the list may be blank. If incorrect, please contact your healthcare provider. Carry this list with you in case of emergency.           Current Medications     acetaminophen (TYLENOL) 500 MG tablet Take 500 mg by mouth every 6 (six) hours as needed for Pain.    ALLEGRA-D 12 HOUR  mg per tablet     brompheniramine-pseudoeph-DM 2-30-10 mg/5 mL Syrp TK 1 TO 2 TEA PO Q 6 H PRN CNC    buPROPion (WELLBUTRIN XL) 150 MG TB24 tablet TK 1 T PO D    LACTOBACILLUS ACIDOPHILUS (PROBIOTIC ORAL) Take by mouth once daily.    oxycodone-acetaminophen (PERCOCET)  mg per tablet Take 1 tablet by mouth every 4-6 hours as needed for pain. Take stool softener with this medication.    promethazine (PHENERGAN) 25 MG tablet Take 1 tablet (25 mg total) by mouth every 6 (six) hours as needed for Nausea.    psyllium (METAMUCIL) packet Take 1 packet by mouth once daily.    rabeprazole (ACIPHEX) 20  mg tablet TK 1 T PO QD    sulfacetamide sodium-sulfur 10-5 % (w/w) Clsr     tramadol (ULTRAM) 50 mg tablet Take 1-2 tablets ( mg total) by mouth every 6 (six) hours as needed for Pain.           Clinical Reference Information           Allergies as of 3/31/2017     Penicillins      Immunizations Administered on Date of Encounter - 3/31/2017     None      [facility] Sign-Up     Activating your [facility] account is as easy as 1-2-3!     1) Visit [email or web address], select Sign Up Now, enter this activation code and your date of birth, then select Next.  [email or web address]  Expires: 4/30/2017 12:50 PM      2) Create a username and password to use when you visit [facility] in the future and select a security question in case you lose your password and select Next.    3) Enter your e-mail address and click Sign Up!    Additional Information  If you have questions, please e-mail [email or web address] or call [phone] to talk to our [facility] staff. Remember, [facility] is NOT to be used for urgent needs. For medical emergencies, dial 911.         Smoking Cessation     If you would like to quit smoking:   You may be eligible for free services if you are a [place] resident and started smoking cigarettes before September 1, 1988.  Call the Smoking Cessation Trust ([facility]) toll free at ([phone] or [phone].   Call 1-800-QUIT-NOW if you do not meet the above criteria.   Contact us via email: [email or web address]   View our website for more information: [email or web address]        Language Assistance Services     ATTENTION: Language assistance services are available, free of charge. Please call [phone].      ATENCIÓN: Si habla samantha, tiene a james disposición servicios gratuitos de asistencia lingüística. Llame al [phone].     CHÚ Ý: N?u b?n nói Ti?ng Vi?t, có các d?ch v? h? tr? ngôn ng? mi?n phí dành cho b?n. G?i s? [phone].         [facility] complies with  applicable Federal civil rights laws and does not discriminate on the basis of race, color, national origin, age, disability, or sex.

## 2017-04-04 ENCOUNTER — CLINICAL SUPPORT (OUTPATIENT)
Dept: REHABILITATION | Facility: HOSPITAL | Age: 52
End: 2017-04-04
Attending: ORTHOPAEDIC SURGERY
Payer: COMMERCIAL

## 2017-04-04 DIAGNOSIS — M25.621 STIFFNESS OF RIGHT ELBOW JOINT: ICD-10-CM

## 2017-04-04 PROCEDURE — 97161 PT EVAL LOW COMPLEX 20 MIN: CPT | Performed by: PHYSICAL THERAPIST

## 2017-04-04 PROCEDURE — 97110 THERAPEUTIC EXERCISES: CPT | Performed by: PHYSICAL THERAPIST

## 2017-04-04 NOTE — PLAN OF CARE
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  Functional Limitations:    FOTO:    Symptoms:  Pain scale 0 at rest, has been using the elbow in range allotted (full flex to 90 deg) without pain   Patient goals:   "get me back out there"  Other:  Objective:  Observation:  Healing volar and dorsal incisions, enters PT in brace locked to 90 with full flex available, mod effusion R elbow and min upper arm atrophy  ROM:  right/left   0/75/136   0/0/150  Strength/MMT: right/left  NT secondary to surgery  Joint mobility:  NT secondary to surgery  Proximal radioulnar joint  Distal radioulnar joint  Ulnohumeral joint    Radiohumeral joint    Neural mobility:   NA  Posture:   Fair unsupported sitting posture  Special Tests:  NT secondary to surgery  Assessment:  I Epicondylitis only, low demand  II Epicondylitis only, moderate to high demand  III Nerve entrapment with or without epicondylitis, low demand  IV Nerve entrapment with or without epicondylitis, moderate to high demand  V Elbow joint dysfunction with or without epicondylitis or nerve entrapment, low demand  X  VI Elbow joint dysfunction with or without epicondylitis or nerve entrapment, moderate to high demand  VII Other  History  Co-morbidities and personal factors that may impact the plan of care Examination  Body Structures and Functions, activity limitations and participation restrictions that may impact the plan of care Clinical Presentation   Decision Making/ Complexity Score   Co-morbidities:       none          Personal Factors:   none Body Regions:  UE  Body Systems:   musculoskeletal     Activity limitations:   Unable to perform ADLs    Participation Restrictions: "   Unable to fully perform work duties        Stable  Low complexity        Patient expectations are realistic? Y  Primary Functional Limitation (Difficulty with):  Self managing elbow impairments              Required for ADL/work/leisure  Time frame 1-2 weeks   ADLs (drinking/eating, donning/doffing clothes, washing/grooming)  Required for ADL  Time frame 8-12 weeks   Recreation  Time frame 16-24 weeks   Work  Time frame 16-24 weeks full work duties  Goals:  ST-6 weeks   1. Decrease swelling to min   2. Increase %  3. Increase strength 25%  LT-24 weeks   1. Decrease complaints of pain 100%  2. Restore 100% strength t/o elbow  3. Return to 100% preinjury level of function (see functional limitations)  Treatment Plan:  X  Therapeutic exercise (PROM/AROM/AAROM/Stretching/Strengthening)  X  Neuromuscular re-education (upper extremity proprioception, neural gliding)  X  Joint mobilization   X  Soft tissue mobilization   X  Therapeutic Activities (education, posture, body mechanics, work method training)  X  Modalities (ice/heat/ultrasound/electric stimulation/iontophoresis)  Treatment Today:  X  Evaluation  X  Therapeutic exercise: self stretch w' flex and ext 3x:15 each, AROM w' flex, ext and RD 2x30 each, grasp 2x30, self ROM forearm sup 1x15  Neuromuscular education (see exercise sheet)  Joint mobilization   Soft tissue mobilization   X  Therapeutic activities (education,  posture, body mechanics, work method training)  X  Modalities (ice/heat/ultrasound/electric stimulation/iontophoresis) CP x 10'   Self Management (home program)  X  PROM, AROM, AAROM  X  Stretching  Strengthening  X  Ice  Proper posture/positioning  Stabilization activities  Rehabilitation Potential: good   Frequency 1x/week  Duration 16-24 weeks   Number of visits 16-24  Pt given HEP per first visit Tala  Pt I in performance  Complications/precautions:  Pt has no cultural, educational, language barriers to learning.   Therapist  Signature:   Cleveland Smalls, PT                                               Date: 4/4/2017  I certify that these services furnished under this plan of treatment and while under my care ___________________ physician/referring practitioner.   Date of Signature:

## 2017-04-04 NOTE — PROGRESS NOTES
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  Functional Limitations:    FOTO:  CK 40<60%  Symptoms:  Pain scale 0 at rest, has been using the elbow in range allotted (full flex to 90 deg) without pain   Patient goals:   "get me back out there"  Other:  Objective:  Observation:  Healing volar and dorsal incisions, enters PT in brace locked to 90 with full flex available, mod effusion R elbow and min upper arm atrophy  ROM:  right/left   0/75/136   0/0/150  Strength/MMT: right/left  NT secondary to surgery  Joint mobility:  NT secondary to surgery  Proximal radioulnar joint  Distal radioulnar joint  Ulnohumeral joint    Radiohumeral joint    Neural mobility:   NA  Posture:   Fair unsupported sitting posture  Special Tests:  NT secondary to surgery  Assessment:  I Epicondylitis only, low demand  II Epicondylitis only, moderate to high demand  III Nerve entrapment with or without epicondylitis, low demand  IV Nerve entrapment with or without epicondylitis, moderate to high demand  V Elbow joint dysfunction with or without epicondylitis or nerve entrapment, low demand  X  VI Elbow joint dysfunction with or without epicondylitis or nerve entrapment, moderate to high demand  VII Other  History  Co-morbidities and personal factors that may impact the plan of care Examination  Body Structures and Functions, activity limitations and participation restrictions that may impact the plan of care Clinical Presentation   Decision Making/ Complexity Score   Co-morbidities:       none          Personal Factors:   none Body Regions:  UE  Body Systems:   musculoskeletal     Activity limitations:   Unable to perform ADLs    Participation " Restrictions:   Unable to fully perform work duties        Stable  Low complexity        Patient expectations are realistic? Y  Primary Functional Limitation (Difficulty with):  Self managing elbow impairments              Required for ADL/work/leisure  Time frame 1-2 weeks   ADLs (drinking/eating, donning/doffing clothes, washing/grooming)  Required for ADL  Time frame 8-12 weeks   Recreation  Time frame 16-24 weeks   Work  Time frame 16-24 weeks full work duties  Goals:  ST-6 weeks   1. Decrease swelling to min   2. Increase %  3. Increase strength 25%  LT-24 weeks   1. Decrease complaints of pain 100%  2. Restore 100% strength t/o elbow  3. Return to 100% preinjury level of function (see functional limitations)  Treatment Plan:  X  Therapeutic exercise (PROM/AROM/AAROM/Stretching/Strengthening)  X  Neuromuscular re-education (upper extremity proprioception, neural gliding)  X  Joint mobilization   X  Soft tissue mobilization   X  Therapeutic Activities (education, posture, body mechanics, work method training)  X  Modalities (ice/heat/ultrasound/electric stimulation/iontophoresis)  Treatment Today:  X  Evaluation  X  Therapeutic exercise: self stretch w' flex and ext 3x:15 each, AROM w' flex, ext and RD 2x30 each, grasp 2x30, self ROM forearm sup 1x15  Neuromuscular education (see exercise sheet)  Joint mobilization   Soft tissue mobilization   X  Therapeutic activities (education,  posture, body mechanics, work method training)  X  Modalities (ice/heat/ultrasound/electric stimulation/iontophoresis) CP x 10'   Self Management (home program)  X  PROM, AROM, AAROM  X  Stretching  Strengthening  X  Ice  Proper posture/positioning  Stabilization activities  Rehabilitation Potential: good   Frequency 1x/week  Duration 16-24 weeks   Number of visits 16-24  Pt given HEP per first visit Tala  Pt I in performance  Complications/precautions:  Pt has no cultural, educational, language barriers to learning.    Therapist Signature:   Cleveland Smalls, PT                                               Date: 4/4/2017  I certify that these services furnished under this plan of treatment and while under my care ___________________ physician/referring practitioner.   Date of Signature:

## 2017-04-12 ENCOUNTER — CLINICAL SUPPORT (OUTPATIENT)
Dept: REHABILITATION | Facility: HOSPITAL | Age: 52
End: 2017-04-12
Attending: ORTHOPAEDIC SURGERY
Payer: COMMERCIAL

## 2017-04-12 DIAGNOSIS — M25.621 STIFFNESS OF RIGHT ELBOW JOINT: ICD-10-CM

## 2017-04-12 PROCEDURE — 97110 THERAPEUTIC EXERCISES: CPT | Performed by: PHYSICAL THERAPIST

## 2017-04-12 NOTE — PROGRESS NOTES
Visit 2/  Name: Noel Sage Jr.  Clinic Number: [id number]  Date of Treatment: 2017   Diagnosis:   Encounter Diagnosis   Name Primary?    Stiffness of right elbow joint    Diagnosis:  S/p R elbow distal biceps repair     Date of Surgery:  3/21/2017  Time in: 13:00  Time Out: 14:00  Total Treatment Time: 60'  Group Time: NA  Subjective:  Noel reports improvement of symptoms.  Patient reports their pain to be 0/10 on a 0-10 scale with 0 being no pain and 10 being the worst pain imaginable.  Objective  PO 22  Pt I in HEP   Noel received therapeutic exercises to develop ROM/flexibility/strength  for 45 minutes including:   STM to volar and dorsal incisions  PROM e' flex and ext  AROM w' ext 1# 3x15  AROM w' RD 1# 3x15  AROM w' flex 2# 3x15  AROM s' flex to abd 3x10:05 0# in brace locked  AROM s' ER in brace locked 3x15 :05  0#  AROM s' ext in brace locked 3x15:05 0#  CP x 10'   Written Home Exercises Provided:  Cont present plan   Pt demo good understanding of the education provided. Noel demonstrated good return demonstration of activities.   Assessment:   ROM improving in both flex and extension  Progressing as expected  Still limited by healing constraints  Pt will continue to benefit from skilled PT intervention. Medical Necessity is demonstrated by:  Unable to participate in daily activities and Unable to participate fully in daily activities.  Patient is making good progress towards established goals.  New/Revised goals:   Goals:  ST-6 weeks   1. Decrease swelling to min   2. Increase %  3. Increase strength 25%  LT-24 weeks   1. Decrease complaints of pain 100%  2. Restore 100% strength t/o elbow  3. Return to 100% preinjury level of function (see functional limitations)  Plan:  Continue with established Plan of Care towards PT goals.   Cleveland Smalls, PT  2017

## 2017-04-19 ENCOUNTER — CLINICAL SUPPORT (OUTPATIENT)
Dept: REHABILITATION | Facility: HOSPITAL | Age: 52
End: 2017-04-19
Attending: ORTHOPAEDIC SURGERY
Payer: COMMERCIAL

## 2017-04-19 DIAGNOSIS — M25.621 STIFFNESS OF RIGHT ELBOW JOINT: ICD-10-CM

## 2017-04-19 PROCEDURE — 97110 THERAPEUTIC EXERCISES: CPT | Performed by: PHYSICAL THERAPIST

## 2017-04-19 NOTE — PROGRESS NOTES
"Visit 3/  Name: Noel Sage Jr.  Clinic Number: [id number]  Date of Treatment: 2017   Diagnosis:   Encounter Diagnosis   Name Primary?    Stiffness of right elbow joint    Diagnosis:  S/p R elbow distal biceps repair     Date of Surgery:  3/21/2017  Time in: 14:30  Time Out: 15:30  Total Treatment Time: 60'  Group Time: NA  Subjective:  Noel reports "it's feeling great"   Patient reports their pain to be 0/10 on a 0-10 scale with 0 being no pain and 10 being the worst pain imaginable.  Objective  PO 29   Noel received therapeutic exercises to develop ROM/flexibility/strength  for 45 minutes including:   STM to volar and dorsal incisions  PROM e' flex and ext  MR e' ext 2D (no pronation) 3x15   MR w' ext 1# 3x15  MR w' RD 1# 3x15  MR w' flex 2# 3x15  AROM  s' flex to abd 3x10:05 1.5# in brace locked  AROM s' ER in brace locked 3x15 :05  1.5#  AROM s' ext in brace locked 3x15:05 1.5#  CP x 10'   Written Home Exercises Provided:  Cont present plan   Pt demo good understanding of the education provided. Noel demonstrated good return demonstration of activities.   Assessment:   Improved PROM/joint mobility at distal RU joint and wrist   Pt still limited by healing constraints   Progressing as expected  Still limited by healing constraints  Pt will continue to benefit from skilled PT intervention. Medical Necessity is demonstrated by:  Unable to participate in daily activities and Unable to participate fully in daily activities.  Patient is making good progress towards established goals.  New/Revised goals:   Goals:  ST-6 weeks   1. Decrease swelling to min   2. Increase %  3. Increase strength 25%  LT-24 weeks   1. Decrease complaints of pain 100%  2. Restore 100% strength t/o elbow  3. Return to 100% preinjury level of function (see functional limitations)  Plan:  Continue with established Plan of Care towards PT goals.   Cleveland Smalls, PT  2017  "

## 2017-04-26 ENCOUNTER — CLINICAL SUPPORT (OUTPATIENT)
Dept: REHABILITATION | Facility: HOSPITAL | Age: 52
End: 2017-04-26
Attending: ORTHOPAEDIC SURGERY
Payer: COMMERCIAL

## 2017-04-26 DIAGNOSIS — M25.621 STIFFNESS OF RIGHT ELBOW JOINT: ICD-10-CM

## 2017-04-26 PROCEDURE — 97110 THERAPEUTIC EXERCISES: CPT | Performed by: PHYSICAL THERAPIST

## 2017-04-26 NOTE — PROGRESS NOTES
Visit 4/  Name: Noel Sage Jr.  Clinic Number: [id number]  Date of Treatment: 2017   Diagnosis:   Encounter Diagnosis   Name Primary?    Stiffness of right elbow joint    Diagnosis:  S/p R elbow distal biceps repair     Date of Surgery:  3/21/2017  Time in: 13:30  Time Out: 14:30  Total Treatment Time: 60'  Group Time: NA  Subjective:  Noel reports irritation from brace       Patient reports their pain to be 0/10 on a 0-10 scale with 0 being no pain and 10 being the worst pain imaginable.  Objective  PO 36  Noel received therapeutic exercises to develop ROM/flexibility/strength  for 45 minutes one on one with PT including:   STM to volar and dorsal incisions  PROM e' flex and ext  MR e' ext 2D (no pronation) 3x15   MR w' ext  3xburn  MR w' RD 3x15  MR w' flex  3xburn  MR w' RD 3x15  MR finger flex / ext 3x15 each  AROM  s' flex to abd 3x10:05 2.5# in brace locked  AROM s' ER in brace locked 3x15 :05  2.5#  AROM s' ext in brace locked 3x15:05 2.5#  CP x 10'   Written Home Exercises Provided:  Cont present plan   Pt demo good understanding of the education provided. Noel demonstrated good return demonstration of activities.   Assessment:   Near full ROM e' flex, full ROM e' ext   Progressing as expected  Still limited by healing constraints  Pt will continue to benefit from skilled PT intervention. Medical Necessity is demonstrated by:  Unable to participate in daily activities and Unable to participate fully in daily activities.  Patient is making good progress towards established goals.  New/Revised goals:   Goals:  ST-6 weeks   1. Decrease swelling to min   2. Increase %  3. Increase strength 25%  LT-24 weeks   1. Decrease complaints of pain 100%  2. Restore 100% strength t/o elbow  3. Return to 100% preinjury level of function (see functional limitations)  Plan:  Brace x 5 days, then DC   Continue with established Plan of Care towards PT goals.   Cleveland Smalls, PT  2017

## 2017-05-01 ENCOUNTER — OFFICE VISIT (OUTPATIENT)
Dept: SPORTS MEDICINE | Facility: CLINIC | Age: 52
End: 2017-05-01
Payer: COMMERCIAL

## 2017-05-01 DIAGNOSIS — M25.521 RIGHT ELBOW PAIN: Primary | ICD-10-CM

## 2017-05-01 PROCEDURE — 99024 POSTOP FOLLOW-UP VISIT: CPT | Mod: S$GLB,,, | Performed by: ORTHOPAEDIC SURGERY

## 2017-05-01 PROCEDURE — 99999 PR PBB SHADOW E&M-EST. PATIENT-LVL I: CPT | Mod: PBBFAC,,, | Performed by: ORTHOPAEDIC SURGERY

## 2017-05-01 NOTE — PROGRESS NOTES
CC: Right elbow pain    HPI: The patient presents to clinic status post his distal bicep tendon repair. He notes he is doing well. We have reviewed his findings and discussed plan of care and future treatment options.     DATE OF PROCEDURE: 03/21/2017  PROCEDURE PERFORMED:  Open repair of Right distal biceps tendon.     He only notes some discomfort  Pain today is 0/10  He arrived today with his splint altered slightly as he notes that he had a panic attack and felt like his arm needed to breathe   He notes numbness in his forearm on the ulnar aspect, he notes it was hard to determine if it has improved due to the splint                                                                               PHYSICAL EXAMINATION:     Incision sites healed well  Sutures removed today  No evidence of any erythema, infection or induration  Range of motion  2+ DP pulse  Mild swelling  Much Improved Decreased sensation in ulnar distribution in forearm and thumb                                                                              ASSESSMENT:                                                                                                                                               1. Status post above, doing well.                                                                                                                               PLAN:                                                                                                                                                     1. Cont PT with Cleveland   2. D/c brace  3. I have discussed return to activity in detail.  4. he will see us back in 12 weeks.                                      5. All questions were answered and he should contact us if he  has any questions or concerns in the interim.

## 2017-05-01 NOTE — MR AVS SNAPSHOT
[facility]  [address]  [place] [address]  Phone: [phone]                  Noel Sage JrOpal   2017 1:30 PM   Appointment    Description:  Male : 1965   Provider:  Mara Wilder MD   Department:  [facility]                To Do List           Future Appointments        Provider Department Dept Phone    2017 1:30 PM Mara Wilder MD [facility] [phone]    5/3/2017 1:30 PM Cleveland Smalls Jr., PT [facility] [phone]    5/10/2017 1:30 PM Cleveland Smalls Jr., PT [facility] [phone]    2017 1:30 PM Cleveland Smalls Jr., PT [facility] [phone]    2017 1:30 PM Cleveland Smalls Jr., PT [facility] [phone]      Goals (5 Years of Data)     None      [facility]     [facility] Care Line -  Assistance  Unless otherwise directed by your provider, please contact [facility], our nurse care line that is available for  assistance.     Registered nurses in the [facility] provide: appointment scheduling, clinical advisement, health education, and other advisory services.  Call: [phone] (toll free)               Medications                Verify that the below list of medications is an accurate representation of the medications you are currently taking.  If none reported, the list may be blank. If incorrect, please contact your healthcare provider. Carry this list with you in case of emergency.           Current Medications     acetaminophen (TYLENOL) 500 MG tablet Take 500 mg by mouth every 6 (six) hours as needed for Pain.    ALLEGRA-D 12 HOUR  mg per tablet     brompheniramine-pseudoeph-DM 2-30-10 mg/5 mL Syrp TK 1 TO 2 TEA PO Q 6 H PRN CNC    buPROPion (WELLBUTRIN XL) 150 MG TB24 tablet TK 1 T PO D    LACTOBACILLUS ACIDOPHILUS (PROBIOTIC ORAL) Take by mouth once daily.    oxycodone-acetaminophen  (PERCOCET)  mg per tablet Take 1 tablet by mouth every 4-6 hours as needed for pain. Take stool softener with this medication.    promethazine (PHENERGAN) 25 MG tablet Take 1 tablet (25 mg total) by mouth every 6 (six) hours as needed for Nausea.    psyllium (METAMUCIL) packet Take 1 packet by mouth once daily.    rabeprazole (ACIPHEX) 20 mg tablet TK 1 T PO QD    sulfacetamide sodium-sulfur 10-5 % (w/w) Clsr     tramadol (ULTRAM) 50 mg tablet Take 1-2 tablets ( mg total) by mouth every 6 (six) hours as needed for Pain.           Clinical Reference Information           Allergies as of 5/1/2017     Penicillins      Immunizations Administered on Date of Encounter - 5/1/2017     None      [facility] Sign-Up     Activating your [facility] account is as easy as 1-2-3!     1) Visit [email or web address], select Sign Up Now, enter this activation code and your date of birth, then select Next.  [email or web address]  Expires: 6/15/2017  1:24 PM      2) Create a username and password to use when you visit [facility] in the future and select a security question in case you lose your password and select Next.    3) Enter your e-mail address and click Sign Up!    Additional Information  If you have questions, please e-mail [email or web address] or call [phone] to talk to our [facility] staff. Remember, [facility] is NOT to be used for urgent needs. For medical emergencies, dial 911.         Smoking Cessation     If you would like to quit smoking:   You may be eligible for free services if you are a [place] resident and started smoking cigarettes before September 1, 1988.  Call the Smoking Cessation [employer] (SCT) toll free at (393) [phone] or (515) 991-1933.   Call 6-099-QUIT-NOW if you do not meet the above criteria.   Contact us via email: [email or web address]   View our website for more information: [email or web address]        Language Assistance Services     ATTENTION: Language assistance services are  available, free of charge. Please call [phone].      ATENCIÓN: Si habla samantha, tiene a james disposición servicios gratuitos de asistencia lingüística. Llame al [phone].     CHÚ Ý: N?u b?n nói Ti?ng Vi?t, có các d?ch v? h? tr? ngôn ng? mi?n phí dành cho b?n. G?i s? [phone].         [facility] complies with applicable Federal civil rights laws and does not discriminate on the basis of race, color, national origin, age, disability, or sex.

## 2017-05-03 ENCOUNTER — CLINICAL SUPPORT (OUTPATIENT)
Dept: REHABILITATION | Facility: HOSPITAL | Age: 52
End: 2017-05-03
Attending: ORTHOPAEDIC SURGERY
Payer: COMMERCIAL

## 2017-05-03 DIAGNOSIS — M25.621 STIFFNESS OF RIGHT ELBOW JOINT: ICD-10-CM

## 2017-05-03 PROCEDURE — 97110 THERAPEUTIC EXERCISES: CPT | Performed by: PHYSICAL THERAPIST

## 2017-05-03 NOTE — PROGRESS NOTES
"Visit 5/  Name: Noel Sage Jr.  Clinic Number: [id number]  Date of Treatment: 2017   Diagnosis:   Encounter Diagnosis   Name Primary?    Stiffness of right elbow joint    Diagnosis:  S/p R elbow distal biceps repair     Date of Surgery:  3/21/2017  Time in: 13:30  Time Out: 14:30  Total Treatment Time: 60'  Group Time: NA  Subjective:  Noel reports no new c/o this PM in R e' "I'm using it but not lifting anything with it", willing to attempt Tala as tolerated        Patient reports their pain to be 0/10 on a 0-10 scale with 0 being no pain and 10 being the worst pain imaginable.  Objective  PO 43   Noel received therapeutic exercises to develop ROM/flexibility/strength  for 45 minutes including:   tricep press downs blue  3x15  Icelandic curls 3x15 3#  Prone tri kick back 3x15 3#  E' curls 3D 2# 3x15  w' ext st. 6#  3xburn  Small bat w' RD 3x15   w' flex 8#  3xburn  Small bat w' RD 3x15  Small bat forearm sup/pron 3x15   Pt declined use of CP    Written Home Exercises Provided:  Cont present plan   Pt demo good understanding of the education provided. Noel demonstrated good return demonstration of activities.   Assessment:   Good training effect in tricep and forearm musculature, no pain in bicep with curls    Progressing as expected  Still limited by healing constraints  Pt will continue to benefit from skilled PT intervention. Medical Necessity is demonstrated by:  Unable to participate in daily activities and Unable to participate fully in daily activities.  Patient is making good progress towards established goals.  New/Revised goals:   Goals:  ST-6 weeks   1. Decrease swelling to min   2. Increase %  3. Increase strength 25%  LT-24 weeks   1. Decrease complaints of pain 100%  2. Restore 100% strength t/o elbow  3. Return to 100% preinjury level of function (see functional limitations)  Plan:  DC brace  Continue with established Plan of Care towards PT goals.   Cleveland Smalls, " PT  5/3/2017

## 2017-05-10 ENCOUNTER — CLINICAL SUPPORT (OUTPATIENT)
Dept: REHABILITATION | Facility: HOSPITAL | Age: 52
End: 2017-05-10
Attending: ORTHOPAEDIC SURGERY
Payer: COMMERCIAL

## 2017-05-10 DIAGNOSIS — M25.621 STIFFNESS OF RIGHT ELBOW JOINT: ICD-10-CM

## 2017-05-10 PROCEDURE — 97110 THERAPEUTIC EXERCISES: CPT | Performed by: PHYSICAL THERAPIST

## 2017-05-10 NOTE — PROGRESS NOTES
"Visit 6/  Name: Noel Sage Jr.  Clinic Number: [id number]  Date of Treatment: 05/10/2017   Diagnosis:   No diagnosis found.Diagnosis:  S/p R elbow distal biceps repair     Date of Surgery:  3/21/2017  Time in: 13:30  Time Out: 14:30  Total Treatment Time: 60'  Group Time: NA  Subjective:  Noel reports "I'm doing great"         Patient reports their pain to be 0/10 on a 0-10 scale with 0 being no pain and 10 being the worst pain imaginable.  Objective  PO 50  Noel received therapeutic exercises to develop ROM/flexibility/strength  for 45 minutes including:  UBE 6' 3.0  Self stretch w' flex and tennis e' stretch 3x:15 each  MR submax e' flex 3D 3x15  MR forearm pron 3x15  MR submax forearm sup 3x15  MR w' 4D 3xburn   S/L ER 3# 3xburn   Prone s' ext/ER 3xburn 2#  Prone HAB/ER 3x10 2#  Pt declined use of CP    Written Home Exercises Provided:  Cont present plan   Pt demo good understanding of the education provided. Noel demonstrated good return demonstration of activities.   Assessment:   Bicep appears to be progressing, with continued muscle weakness noted    Progressing as expected  Still limited by healing constraints  Pt will continue to benefit from skilled PT intervention. Medical Necessity is demonstrated by:  Unable to participate in daily activities and Unable to participate fully in daily activities.  Patient is making good progress towards established goals.  New/Revised goals:   Goals:  ST-6 weeks   1. Decrease swelling to min   2. Increase %  3. Increase strength 25%  LT-24 weeks   1. Decrease complaints of pain 100%  2. Restore 100% strength t/o elbow  3. Return to 100% preinjury level of function (see functional limitations)  Plan:  Continue with established Plan of Care towards PT goals.   Cleveland Smalls, PT  5/10/2017  "

## 2017-05-17 ENCOUNTER — CLINICAL SUPPORT (OUTPATIENT)
Dept: REHABILITATION | Facility: HOSPITAL | Age: 52
End: 2017-05-17
Attending: ORTHOPAEDIC SURGERY
Payer: COMMERCIAL

## 2017-05-17 DIAGNOSIS — M25.621 STIFFNESS OF RIGHT ELBOW JOINT: ICD-10-CM

## 2017-05-17 PROCEDURE — 97110 THERAPEUTIC EXERCISES: CPT | Performed by: PHYSICAL THERAPIST

## 2017-05-17 NOTE — PROGRESS NOTES
Visit 7/  Name: Noel Sage Jr.  Clinic Number: [id number]  Date of Treatment: 2017   Diagnosis:   Encounter Diagnosis   Name Primary?    Stiffness of right elbow joint    Diagnosis:  S/p R elbow distal biceps repair     Date of Surgery:  3/21/2017  Time in: 13:30  Time Out: 14:30  Total Treatment Time: 60'  Group Time: NA  Subjective:  Noel reports no new c/o this PM in R elbow, willing to attempt Tala as tolerated         Patient reports their pain to be 0/10 on a 0-10 scale with 0 being no pain and 10 being the worst pain imaginable.  Objective  PO 57  Noel received therapeutic exercises to develop ROM/flexibility/strength  for 60 minutes one on one with PT including:  UBE 6' 3.0  Self stretch w' flex and tennis e' stretch 3x:15 each  E' curls 3# 3D 3x15 superset  y-t-w 2# 3x10 over red PB  Alphabet FW and side yll 3xburn  Ball on wall OH red 3xburn  S/L FF 3x15 3#  w' 4D 3xburn blue TB  MR finger flex/ext 3x15  Pt declined use of CP    Written Home Exercises Provided:  Cont present plan   Pt demo good understanding of the education provided. Noel demonstrated good return demonstration of activities.   Assessment:   Good training effect in all R UE musculature   Progressing as expected  Still limited by healing constraints  Pt will continue to benefit from skilled PT intervention. Medical Necessity is demonstrated by:  Unable to participate in daily activities and Unable to participate fully in daily activities.  Patient is making good progress towards established goals.  New/Revised goals:   Goals:  ST-6 weeks   1. Decrease swelling to min   2. Increase %  3. Increase strength 25%  LT-24 weeks   1. Decrease complaints of pain 100%  2. Restore 100% strength t/o elbow  3. Return to 100% preinjury level of function (see functional limitations)  Plan:  Continue with established Plan of Care towards PT goals.   Cleveland Smalls, PT  2017

## 2017-05-31 ENCOUNTER — CLINICAL SUPPORT (OUTPATIENT)
Dept: REHABILITATION | Facility: HOSPITAL | Age: 52
End: 2017-05-31
Attending: ORTHOPAEDIC SURGERY
Payer: COMMERCIAL

## 2017-05-31 DIAGNOSIS — M25.621 STIFFNESS OF RIGHT ELBOW JOINT: ICD-10-CM

## 2017-05-31 PROCEDURE — 97110 THERAPEUTIC EXERCISES: CPT | Performed by: PHYSICAL THERAPIST

## 2017-05-31 NOTE — PROGRESS NOTES
"Visit 8/  Name: Noel Sage Jr.  Clinic Number: [id number]  Date of Treatment: 2017   Diagnosis:   Encounter Diagnosis   Name Primary?    Stiffness of right elbow joint    Diagnosis:  S/p R elbow distal biceps repair     Date of Surgery:  3/21/2017  Time in: 13:30  Time Out: 14:30  Total Treatment Time: 60'  Group Time: NA  Subjective:  Noel reports "My elbow feels great"         Patient reports their pain to be 0/10 on a 0-10 scale with 0 being no pain and 10 being the worst pain imaginable.  Objective  PO 71  Noel received therapeutic exercises to develop ROM/flexibility/strength  for 45 minutes including:  Arm circles 1x30 FW and BW  Push ups wt bench 3x10  DB BP 3x15 10#  CC LPD 3x15 4p  CC row 3x15 4p  CC U tri press 3p 3x15 R/L  CC U bi curl  2p 3x15 R/L  S/L ER 3# 3xburn R/L  Front DB raise 5# 3x10  Lat DB raise 4# 3x10  St w' curls 8# superset to w' ext 5#  CP x 10'     Written Home Exercises Provided:  Cont present plan   Pt demo good understanding of the education provided. Noel demonstrated good return demonstration of activities.   Assessment:   R elbow tolerated prime  strengthening   Progressing as expected  Still limited by healing constraints  Pt will continue to benefit from skilled PT intervention. Medical Necessity is demonstrated by:  Unable to participate in daily activities and Unable to participate fully in daily activities.  Patient is making good progress towards established goals.  New/Revised goals:   Goals:  ST-6 weeks   1. Decrease swelling to min   2. Increase %  3. Increase strength 25%  LT-24 weeks   1. Decrease complaints of pain 100%  2. Restore 100% strength t/o elbow  3. Return to 100% preinjury level of function (see functional limitations)  Plan:  Continue with established Plan of Care towards PT goals with focus on restoring full strength and function   Cleveland Smalls, PT  2017  "

## 2017-06-14 ENCOUNTER — CLINICAL SUPPORT (OUTPATIENT)
Dept: REHABILITATION | Facility: HOSPITAL | Age: 52
End: 2017-06-14
Attending: ORTHOPAEDIC SURGERY
Payer: COMMERCIAL

## 2017-06-14 DIAGNOSIS — M62.81 MUSCLE WEAKNESS OF LEFT UPPER EXTREMITY: ICD-10-CM

## 2017-06-14 PROCEDURE — 97530 THERAPEUTIC ACTIVITIES: CPT | Performed by: PHYSICAL THERAPIST

## 2017-06-14 NOTE — PROGRESS NOTES
"Visit 9/  Name: Noel Sage Jr.  Clinic Number: [id number]  Date of Treatment: 2017   Diagnosis:   Encounter Diagnosis   Name Primary?    Muscle weakness of left upper extremity    Diagnosis:  S/p R elbow distal biceps repair     Date of Surgery:  3/21/2017  Time in: 13:30  Time Out: 14:30  Total Treatment Time: 60'  Group Time: NA  Subjective:  Neol reports "My elbow really does feel great"  Pt has been wt training in gym without incident         Patient reports their pain to be 0/10 on a 0-10 scale with 0 being no pain and 10 being the worst pain imaginable.  Objective  PO 85  Patient participated in dynamic functional therapeutic activities to improve functional performance for  60 minutes one on one with PT  Including:   Self warm up/stretch  TB ER 90-90 plyo green TB superset to w' ext 3xburn  TB IR 90-90 plyo blue 3x30 superset to w' flex 3xburn   CP 3x20  6lb  diag 3x10  1.5 kg  OH 3x10 1.5kg  90-90 3x20 red  Ball drop w' ext 3xburn red  w' flex plyo red 3x30  S/L ER 3xburn red plyo  Prone ER 90-90 3xburn red plyo  CP x 10'     Written Home Exercises Provided:  Cont present plan   Pt demo good understanding of the education provided. Noel demonstrated good return demonstration of activities.   Assessment:   R elbow tolerated UE plyo series without pain/soreness in R e'    Progressing as expected  Still limited by healing constraints  Pt will continue to benefit from skilled PT intervention. Medical Necessity is demonstrated by:  Unable to participate in daily activities and Unable to participate fully in daily activities.  Patient is making good progress towards established goals.  New/Revised goals:   Goals:  ST-6 weeks   1. Decrease swelling to min   2. Increase %  3. Increase strength 25%  LT-24 weeks   1. Decrease complaints of pain 100%  2. Restore 100% strength t/o elbow  3. Return to 100% preinjury level of function (see functional limitations)  Plan:  Continue with " established Plan of Care towards PT goals with focus on restoring full strength and function   Cleveland Smalls, PT  6/14/2017

## 2020-01-22 RX ORDER — BUPROPION HYDROCHLORIDE 150 MG/1
150 TABLET ORAL DAILY
Qty: 30 TABLET | Refills: 5 | Status: SHIPPED | OUTPATIENT
Start: 2020-01-22 | End: 2020-09-10

## 2020-01-30 ENCOUNTER — OFFICE VISIT (OUTPATIENT)
Dept: FAMILY MEDICINE | Facility: CLINIC | Age: 55
End: 2020-01-30
Payer: COMMERCIAL

## 2020-01-30 VITALS
HEIGHT: 70 IN | BODY MASS INDEX: 27.52 KG/M2 | WEIGHT: 192.25 LBS | TEMPERATURE: 98 F | OXYGEN SATURATION: 98 % | SYSTOLIC BLOOD PRESSURE: 120 MMHG | RESPIRATION RATE: 18 BRPM | DIASTOLIC BLOOD PRESSURE: 80 MMHG | HEART RATE: 85 BPM

## 2020-01-30 DIAGNOSIS — F33.0 DEPRESSION, MAJOR, RECURRENT, MILD: ICD-10-CM

## 2020-01-30 DIAGNOSIS — F41.9 ANXIETY: ICD-10-CM

## 2020-01-30 DIAGNOSIS — K21.9 GASTROESOPHAGEAL REFLUX DISEASE WITHOUT ESOPHAGITIS: Primary | ICD-10-CM

## 2020-01-30 PROCEDURE — 99213 OFFICE O/P EST LOW 20 MIN: CPT | Mod: S$GLB,,, | Performed by: INTERNAL MEDICINE

## 2020-01-30 PROCEDURE — 99213 PR OFFICE/OUTPT VISIT, EST, LEVL III, 20-29 MIN: ICD-10-PCS | Mod: S$GLB,,, | Performed by: INTERNAL MEDICINE

## 2020-01-30 PROCEDURE — 99999 PR PBB SHADOW E&M-EST. PATIENT-LVL IV: ICD-10-PCS | Mod: PBBFAC,,, | Performed by: INTERNAL MEDICINE

## 2020-01-30 PROCEDURE — 99999 PR PBB SHADOW E&M-EST. PATIENT-LVL IV: CPT | Mod: PBBFAC,,, | Performed by: INTERNAL MEDICINE

## 2020-01-30 NOTE — PROGRESS NOTES
[facility] Note    Chief Complaint      Chief Complaint   Patient presents with    Follow-up     check up /med refill       History of Present Illness      Noel Sage Jr. is a 54 y.o. male who presents today for   Chief Complaint   Patient presents with    Follow-up     check up /med refill   .  Patient comes to appointment today for needing refills for meds . He is also c/o of some issues with stiffness in joints consistent with oa . He is uptodate with colonoscopy with dr antoine . He had full labs done in June as well . he is active with exercise and is eating healthy   HPI    No problem-specific Assessment & Plan notes found for this encounter.       Problem List Items Addressed This Visit        Psychiatric    Anxiety    Overview     See above          Depression, major, recurrent, mild    Overview     Cont welbutrin as prescribed             GI    Gastroesophageal reflux disease without esophagitis - Primary    Overview     Stable on aciphex                 Past Medical History:  Past Medical History:   Diagnosis Date    GERD (gastroesophageal reflux disease)     Seasonal allergies        Past Surgical History:  Past Surgical History:   Procedure Laterality Date    COLONOSCOPY      ESOPHAGOGASTRODUODENOSCOPY      LEG SURGERY Left     torn muscle, repaired, age 18's       Family History:  family history is not on file.     Social History:  Social History     Socioeconomic History    Marital status:      Spouse name: Not on file    Number of children: Not on file    Years of education: Not on file    Highest education level: Not on file   Occupational History    Not on file   Social Needs    Financial resource strain: Not on file    Food insecurity:     Worry: Not on file     Inability: Not on file    Transportation needs:     Medical: Not on file     Non-medical: Not on file   Tobacco Use    Smoking status: Current Some Day Smoker     Types: Cigars     Smokeless tobacco: Never Used   Substance and Sexual Activity    Alcohol use: Yes     Comment: on weekends occasionally    Drug use: No    Sexual activity: Not on file   Lifestyle    Physical activity:     Days per week: Not on file     Minutes per session: Not on file    Stress: Not on file   Relationships    Social connections:     Talks on phone: Not on file     Gets together: Not on file     Attends Mandaeism service: Not on file     Active member of club or organization: Not on file     Attends meetings of clubs or organizations: Not on file     Relationship status: Not on file   Other Topics Concern    Not on file   Social History Narrative    Not on file       Review of Systems:   Review of Systems   Constitutional: Negative for fever and weight loss.   HENT: Negative for congestion, hearing loss and sore throat.    Eyes: Negative for blurred vision.   Respiratory: Negative for cough and shortness of breath.    Cardiovascular: Negative for chest pain, palpitations, claudication and leg swelling.   Gastrointestinal: Negative for constipation, diarrhea and heartburn.   Genitourinary: Negative for dysuria.   Musculoskeletal: Positive for joint pain. Negative for back pain and myalgias.   Skin: Negative for rash.   Neurological: Negative for focal weakness and headaches.   Psychiatric/Behavioral: Negative for depression and suicidal ideas. The patient is not nervous/anxious.         Medications:  Outpatient Encounter Medications as of 1/30/2020   Medication Sig Note Dispense Refill    acetaminophen (TYLENOL) 500 MG tablet Take 500 mg by mouth every 6 (six) hours as needed for Pain.       ALLEGRA-D 12 HOUR  mg per tablet  3/10/2017: Received from: External Pharmacy  2    buPROPion (WELLBUTRIN XL) 150 MG TB24 tablet Take 1 tablet (150 mg total) by mouth once daily.  30 tablet 5    LACTOBACILLUS ACIDOPHILUS (PROBIOTIC ORAL) Take by mouth once daily.       psyllium (METAMUCIL) packet Take 1 packet  "by mouth once daily.       rabeprazole (ACIPHEX) 20 mg tablet TK 1 T PO QD 3/21/2017: Post op  2    sulfacetamide sodium-sulfur 10-5 % (w/w) Clsr  3/10/2017: Received from: External Pharmacy  1    brompheniramine-pseudoeph-DM 2-30-10 mg/5 mL Syrp TK 1 TO 2 TEA PO Q 6 H PRN CNC 3/10/2017: Received from: External Pharmacy  5    promethazine (PHENERGAN) 25 MG tablet Take 1 tablet (25 mg total) by mouth every 6 (six) hours as needed for Nausea. (Patient not taking: Reported on 1/30/2020) 3/21/2017: Post op 30 tablet 0    [DISCONTINUED] oxycodone-acetaminophen (PERCOCET)  mg per tablet Take 1 tablet by mouth every 4-6 hours as needed for pain. Take stool softener with this medication. (Patient not taking: Reported on 1/30/2020) 3/21/2017: Post op 60 tablet 0    [DISCONTINUED] tramadol (ULTRAM) 50 mg tablet Take 1-2 tablets ( mg total) by mouth every 6 (six) hours as needed for Pain. (Patient not taking: Reported on 1/30/2020) 3/21/2017: Post op 60 tablet 0     No facility-administered encounter medications on file as of 1/30/2020.        Allergies:  Review of patient's allergies indicates:   Allergen Reactions    Penicillins      UNKNOWN         Physical Exam      Vitals:    01/30/20 1332   BP: 120/80   Pulse: 85   Resp: 18   Temp: 98.2 °F (36.8 °C)        Vital Signs  Temp: 98.2 °F (36.8 °C)  Temp src: Oral  Pulse: 85  Resp: 18  SpO2: 98 %  BP: 120/80  BP Location: Right arm  Patient Position: Sitting  Pain Score: 0-No pain  Height and Weight  Height: 5' 10" (177.8 cm)  Weight: 87.2 kg (192 lb 3.9 oz)  BSA (Calculated - sq m): 2.08 sq meters  BMI (Calculated): 27.6  Weight in (lb) to have BMI = 25: 173.9]     Body mass index is 27.58 kg/m².    Physical Exam   Constitutional: He is oriented to person, place, and time. He appears well-developed and well-nourished.   HENT:   Head: Normocephalic.   Eyes: Pupils are equal, round, and reactive to light.   Neck: Normal range of motion. No thyromegaly " present.   Cardiovascular: Normal rate and regular rhythm. Exam reveals no gallop and no friction rub.   No murmur heard.  Pulmonary/Chest: Effort normal and breath sounds normal.   Abdominal: Soft. Bowel sounds are normal.   Musculoskeletal: Normal range of motion. He exhibits no edema.   Neurological: He is alert and oriented to person, place, and time. No sensory deficit.   Skin: Skin is warm and dry.   Psychiatric: He has a normal mood and affect. His behavior is normal.        Laboratory:  CBC:  No results for input(s): WBC, RBC, HGB, HCT, PLT, MCV, MCH, MCHC in the last 2160 hours.  CMP:  No results for input(s): GLU, CALCIUM, ALBUMIN, PROT, NA, K, CO2, CL, BUN, ALKPHOS, ALT, AST, BILITOT in the last 2160 hours.    Invalid input(s): CREATININ  URINALYSIS:  No results for input(s): COLORU, CLARITYU, SPECGRAV, PHUR, PROTEINUA, GLUCOSEU, BILIRUBINCON, BLOODU, WBCU, RBCU, BACTERIA, MUCUS, NITRITE, LEUKOCYTESUR, UROBILINOGEN, HYALINECASTS in the last 2160 hours.   LIPIDS:  No results for input(s): TSH, HDL, CHOL, TRIG, LDLCALC, CHOLHDL, NONHDLCHOL, TOTALCHOLEST in the last 2160 hours.  TSH:  No results for input(s): TSH in the last 2160 hours.  A1C:  No results for input(s): HGBA1C in the last 2160 hours.    Radiology:        Assessment:     Noel Sage Jr. is a 54 y.o.male with:    Gastroesophageal reflux disease without esophagitis    Depression, major, recurrent, mild    Anxiety                Plan:     Problem List Items Addressed This Visit        Psychiatric    Anxiety    Overview     See above          Depression, major, recurrent, mild    Overview     Cont welbutrin as prescribed             GI    Gastroesophageal reflux disease without esophagitis - Primary    Overview     Stable on aciphex                As above, continue current medications and maintain follow up with specialists.  Return to clinic in 6  months.    Frederick W Dantagnan [facility] [place]

## 2020-05-08 DIAGNOSIS — Z12.11 COLON CANCER SCREENING: ICD-10-CM

## 2020-07-16 DIAGNOSIS — R52 PAIN: Primary | ICD-10-CM

## 2020-07-17 ENCOUNTER — HOSPITAL ENCOUNTER (OUTPATIENT)
Dept: RADIOLOGY | Facility: HOSPITAL | Age: 55
Discharge: HOME | End: 2020-07-17
Attending: ORTHOPAEDIC SURGERY
Payer: COMMERCIAL

## 2020-07-17 ENCOUNTER — OFFICE VISIT (OUTPATIENT)
Dept: ORTHOPEDICS | Facility: CLINIC | Age: 55
End: 2020-07-17
Payer: COMMERCIAL

## 2020-07-17 DIAGNOSIS — R52 PAIN: ICD-10-CM

## 2020-07-17 DIAGNOSIS — M19.072 ARTHROSIS OF LEFT ANKLE: Primary | ICD-10-CM

## 2020-07-17 PROCEDURE — 73610 XR ANKLE COMPLETE 3 VIEW RIGHT: ICD-10-PCS | Mod: 26,RT,, | Performed by: RADIOLOGY

## 2020-07-17 PROCEDURE — 73610 X-RAY EXAM OF ANKLE: CPT | Mod: 26,RT,, | Performed by: RADIOLOGY

## 2020-07-17 PROCEDURE — 99999 PR PBB SHADOW E&M-EST. PATIENT-LVL I: CPT | Mod: PBBFAC,,, | Performed by: ORTHOPAEDIC SURGERY

## 2020-07-17 PROCEDURE — 99203 PR OFFICE/OUTPT VISIT, NEW, LEVL III, 30-44 MIN: ICD-10-PCS | Mod: S$GLB,,, | Performed by: ORTHOPAEDIC SURGERY

## 2020-07-17 PROCEDURE — 73610 X-RAY EXAM OF ANKLE: CPT | Mod: TC,RT

## 2020-07-17 PROCEDURE — 99203 OFFICE O/P NEW LOW 30 MIN: CPT | Mod: S$GLB,,, | Performed by: ORTHOPAEDIC SURGERY

## 2020-07-17 PROCEDURE — 99999 PR PBB SHADOW E&M-EST. PATIENT-LVL I: ICD-10-PCS | Mod: PBBFAC,,, | Performed by: ORTHOPAEDIC SURGERY

## 2020-07-17 NOTE — PROGRESS NOTES
F&A  Orthopaedics    SUBJECTIVE:     History of Present Illness:  Patient is a 54 y.o. male with pmh history for right ankle injury senior year of high school. He believed it was a sprain initially and didn't pursue treatment. He had continued mild swelling but no significant complaints until about 8-9 years later when he began having increased pain in the ankle. At that time he was told that he had an old fracture and had a loose fragment in his ankle joint causing damage to the joint. He had an arthroscopic ankle surgery ay that time. He had improvement in symptoms after that surgery and has not had complaints regarding the ankle until the past year. Over the last year and particularly in the last few months he has had pain in his ankle when walking. Worse in the morning and once he stops for a bit and then tries to stand up. Better after it warms up with motion. He reports symptoms of locking and popping. He has been taking ibuprofen fairly frequently for this and has had significant improvement with this. No other orthotics or treatments have been pursued to this point.       Review of patient's allergies indicates:   Allergen Reactions    Penicillins      UNKNOWN       Past Medical History:   Diagnosis Date    GERD (gastroesophageal reflux disease)     Seasonal allergies      Past Surgical History:   Procedure Laterality Date    COLONOSCOPY      ESOPHAGOGASTRODUODENOSCOPY      LEG SURGERY Left     torn muscle, repaired, age 18's     History reviewed. No pertinent family history.  Social History     Tobacco Use    Smoking status: Current Some Day Smoker     Types: Cigars    Smokeless tobacco: Never Used   Substance Use Topics    Alcohol use: Yes     Comment: on weekends occasionally    Drug use: No        Review of Systems:  Patient denies constitutional symptoms, cardiac symptoms, respiratory symptoms, GI symptoms.  The remainder of the musculoskeletal ROS is included in the HPI.      OBJECTIVE:      Physical Exam:  Gen:  No acute distress  CV:  Peripherally well-perfused.  Pulses 2+ bilaterally.  Lungs:  Normal respiratory effort.  Abdomen:  Soft, non-tender, non-distended  Head/Neck:  Normocephalic.  Atraumatic. No TTP, AROM and PROM intact without pain  Neuro:  CN intact without deficit, SILT throughout B/L Upper & Lower Extremities    MSK:  RLE:  Skin intact  Well healed 1 cm incisions from prior arthroscopic surgery over medial and lateral ankle  No edema/erythema/signs of infection  No TTP  No pain with ROM of the ankle  Full ROM of the ankle with no pain with maximal dorsiflexion, no impingement on exam  SILT Sa/Swain/DP/SP/T  Motor intact EHL/FHL/TA/Gastroc  2+ DP, 2+ PT        Diagnostic Results:   X-rays were ordered and images reviewed by me.  These showed moderate tibiotalar ankle arthritis with no significant joint space narrowing by cystic changes and diffuse degenerative changes.    ASSESSMENT/PLAN:     1. Arthrosis of left ankle         A/P: Noel RONALD Sage Jr. is a 54 y.o. 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     Plan:  - Discussed with patient options for ankle arthroscopy versus management of symptoms and he would like to watch symptoms for now with continued ibuprofen use for symptoms relief. Will schedule 3 month f/u and if he is still relying on ibuprofen or if symptoms are no better or worse then will revisit scope procedure at that time.    I have personally taken the history and examined this patient and agree with the residents note as stated above.

## 2020-07-30 ENCOUNTER — PATIENT OUTREACH (OUTPATIENT)
Dept: ADMINISTRATIVE | Facility: HOSPITAL | Age: 55
End: 2020-07-30

## 2020-07-30 ENCOUNTER — TELEPHONE (OUTPATIENT)
Dept: ADMINISTRATIVE | Facility: HOSPITAL | Age: 55
End: 2020-07-30

## 2020-07-30 ENCOUNTER — OFFICE VISIT (OUTPATIENT)
Dept: FAMILY MEDICINE | Facility: CLINIC | Age: 55
End: 2020-07-30
Payer: COMMERCIAL

## 2020-07-30 VITALS
DIASTOLIC BLOOD PRESSURE: 86 MMHG | SYSTOLIC BLOOD PRESSURE: 134 MMHG | HEIGHT: 70 IN | OXYGEN SATURATION: 97 % | BODY MASS INDEX: 28.04 KG/M2 | TEMPERATURE: 98 F | WEIGHT: 195.88 LBS | HEART RATE: 93 BPM

## 2020-07-30 DIAGNOSIS — Z11.59 ENCOUNTER FOR HEPATITIS C SCREENING TEST FOR LOW RISK PATIENT: ICD-10-CM

## 2020-07-30 DIAGNOSIS — Z23 NEED FOR TDAP VACCINATION: ICD-10-CM

## 2020-07-30 DIAGNOSIS — Z12.5 PROSTATE CANCER SCREENING: ICD-10-CM

## 2020-07-30 DIAGNOSIS — Z00.00 ANNUAL PHYSICAL EXAM: Primary | ICD-10-CM

## 2020-07-30 DIAGNOSIS — Z11.4 ENCOUNTER FOR SCREENING FOR HIV: ICD-10-CM

## 2020-07-30 PROCEDURE — 90471 IMMUNIZATION ADMIN: CPT | Mod: S$GLB,,, | Performed by: INTERNAL MEDICINE

## 2020-07-30 PROCEDURE — 90715 TDAP VACCINE GREATER THAN OR EQUAL TO 7YO IM: ICD-10-PCS | Mod: S$GLB,,, | Performed by: INTERNAL MEDICINE

## 2020-07-30 PROCEDURE — 90715 TDAP VACCINE 7 YRS/> IM: CPT | Mod: S$GLB,,, | Performed by: INTERNAL MEDICINE

## 2020-07-30 PROCEDURE — 90471 TDAP VACCINE GREATER THAN OR EQUAL TO 7YO IM: ICD-10-PCS | Mod: S$GLB,,, | Performed by: INTERNAL MEDICINE

## 2020-07-30 PROCEDURE — 99396 PREV VISIT EST AGE 40-64: CPT | Mod: S$GLB,25,, | Performed by: INTERNAL MEDICINE

## 2020-07-30 PROCEDURE — 99999 PR PBB SHADOW E&M-EST. PATIENT-LVL IV: ICD-10-PCS | Mod: PBBFAC,,, | Performed by: INTERNAL MEDICINE

## 2020-07-30 PROCEDURE — 99999 PR PBB SHADOW E&M-EST. PATIENT-LVL IV: CPT | Mod: PBBFAC,,, | Performed by: INTERNAL MEDICINE

## 2020-07-30 PROCEDURE — 99396 PR PREVENTIVE VISIT,EST,40-64: ICD-10-PCS | Mod: S$GLB,25,, | Performed by: INTERNAL MEDICINE

## 2020-07-30 NOTE — PROGRESS NOTES
[facility] Note    Chief Complaint      Chief Complaint   Patient presents with    Follow-up     6 month follow up        History of Present Illness      Noel Sage Jr. is a 54 y.o. male who presents today for   Chief Complaint   Patient presents with    Follow-up     6 month follow up    .  Patient comes to appointment for 6 m checkup for chronic issues as below .  Needs full screening labs this visit . He is complaint with all meds and is eating healthy diet , and getting regular exercise .  He has had colonoscopy with dr antoine 6 yrs ago .     HPI    No problem-specific Assessment & Plan notes found for this encounter.       Problem List Items Addressed This Visit        Renal/    Prostate cancer screening    Overview     Check psa            ID    Encounter for hepatitis C screening test for low risk patient    Overview     Hep c screen         Encounter for screening for HIV    Overview     hiv screen             Other    Annual physical exam - Primary    Overview     pe documented needs full screening labs will make recs when all reviewed            Other Visit Diagnoses     Need for Tdap vaccination               Past Medical History:  Past Medical History:   Diagnosis Date    GERD (gastroesophageal reflux disease)     Seasonal allergies        Past Surgical History:  Past Surgical History:   Procedure Laterality Date    COLONOSCOPY      ESOPHAGOGASTRODUODENOSCOPY      LEG SURGERY Left     torn muscle, repaired, age 18's       Family History:  family history is not on file.     Social History:  Social History     Socioeconomic History    Marital status:      Spouse name: Not on file    Number of children: Not on file    Years of education: Not on file    Highest education level: Not on file   Occupational History    Not on file   Social Needs    Financial resource strain: Not on file    Food insecurity     Worry: Not on file     Inability: Not on file     Transportation needs     Medical: Not on file     Non-medical: Not on file   Tobacco Use    Smoking status: Current Some Day Smoker     Types: Cigars    Smokeless tobacco: Never Used   Substance and Sexual Activity    Alcohol use: Yes     Comment: on weekends occasionally    Drug use: No    Sexual activity: Not on file   Lifestyle    Physical activity     Days per week: Not on file     Minutes per session: Not on file    Stress: Not on file   Relationships    Social connections     Talks on phone: Not on file     Gets together: Not on file     Attends Christianity service: Not on file     Active member of club or organization: Not on file     Attends meetings of clubs or organizations: Not on file     Relationship status: Not on file   Other Topics Concern    Not on file   Social History Narrative    Not on file       Review of Systems:   Review of Systems   Constitutional: Negative for fever and weight loss.   HENT: Negative for congestion, hearing loss and sore throat.    Eyes: Negative for blurred vision.   Respiratory: Negative for cough and shortness of breath.    Cardiovascular: Negative for chest pain, palpitations, claudication and leg swelling.   Gastrointestinal: Negative for abdominal pain, constipation, diarrhea and heartburn.   Genitourinary: Negative for dysuria.   Musculoskeletal: Negative for back pain and myalgias.   Skin: Negative for rash.   Neurological: Negative for focal weakness and headaches.   Psychiatric/Behavioral: Negative for depression, memory loss and suicidal ideas. The patient is not nervous/anxious.         Medications:  Outpatient Encounter Medications as of 7/30/2020   Medication Sig Note Dispense Refill    acetaminophen (TYLENOL) 500 MG tablet Take 500 mg by mouth every 6 (six) hours as needed for Pain.       ALLEGRA-D 12 HOUR  mg per tablet  3/10/2017: Received from: External Pharmacy  2    brompheniramine-pseudoeph-DM 2-30-10 mg/5 mL Syrp TK 1 TO 2 TEA PO Q 6 H  "PRN CNC 3/10/2017: Received from: External Pharmacy  5    buPROPion (WELLBUTRIN XL) 150 MG TB24 tablet Take 1 tablet (150 mg total) by mouth once daily.  30 tablet 5    LACTOBACILLUS ACIDOPHILUS (PROBIOTIC ORAL) Take by mouth once daily.       promethazine (PHENERGAN) 25 MG tablet Take 1 tablet (25 mg total) by mouth every 6 (six) hours as needed for Nausea. 3/21/2017: Post op 30 tablet 0    psyllium (METAMUCIL) packet Take 1 packet by mouth once daily.       rabeprazole (ACIPHEX) 20 mg tablet TK 1 T PO QD 3/21/2017: Post op  2    sulfacetamide sodium-sulfur 10-5 % (w/w) Clsr  3/10/2017: Received from: External Pharmacy  1     No facility-administered encounter medications on file as of 7/30/2020.        Allergies:  Review of patient's allergies indicates:   Allergen Reactions    Penicillins      UNKNOWN         Physical Exam      Vitals:    07/30/20 1328   BP: 134/86   Pulse: 93   Temp: 97.7 °F (36.5 °C)        Vital Signs  Temp: 97.7 °F (36.5 °C)  Temp src: Oral  Pulse: 93  SpO2: 97 %  BP: 134/86  Pain Score: 0-No pain  Height and Weight  Height: 5' 10" (177.8 cm)  Weight: 88.9 kg (195 lb 14.1 oz)  BSA (Calculated - sq m): 2.09 sq meters  BMI (Calculated): 28.1  Weight in (lb) to have BMI = 25: 173.9]     Body mass index is 28.11 kg/m².    Physical Exam  Constitutional:       Appearance: He is well-developed.   HENT:      Head: Normocephalic.   Eyes:      Pupils: Pupils are equal, round, and reactive to light.   Neck:      Musculoskeletal: Normal range of motion.      Thyroid: No thyromegaly.   Cardiovascular:      Rate and Rhythm: Normal rate and regular rhythm.      Heart sounds: No murmur. No friction rub. No gallop.    Pulmonary:      Effort: Pulmonary effort is normal.      Breath sounds: Normal breath sounds.   Abdominal:      General: Bowel sounds are normal.      Palpations: Abdomen is soft.   Musculoskeletal: Normal range of motion.   Skin:     General: Skin is warm and dry.   Neurological:      " Mental Status: He is alert and oriented to person, place, and time.      Sensory: No sensory deficit.   Psychiatric:         Behavior: Behavior normal.          Laboratory:  CBC:  No results for input(s): WBC, RBC, HGB, HCT, PLT, MCV, MCH, MCHC in the last 2160 hours.  CMP:  No results for input(s): GLU, CALCIUM, ALBUMIN, PROT, NA, K, CO2, CL, BUN, ALKPHOS, ALT, AST, BILITOT in the last 2160 hours.    Invalid input(s): CREATININ  URINALYSIS:  No results for input(s): COLORU, CLARITYU, SPECGRAV, PHUR, PROTEINUA, GLUCOSEU, BILIRUBINCON, BLOODU, WBCU, RBCU, BACTERIA, MUCUS, NITRITE, LEUKOCYTESUR, UROBILINOGEN, HYALINECASTS in the last 2160 hours.   LIPIDS:  No results for input(s): TSH, HDL, CHOL, TRIG, LDLCALC, CHOLHDL, NONHDLCHOL, TOTALCHOLEST in the last 2160 hours.  TSH:  No results for input(s): TSH in the last 2160 hours.  A1C:  No results for input(s): HGBA1C in the last 2160 hours.    Radiology:        Assessment:     Noel Sage Jr. is a 54 y.o.male with:    Annual physical exam    Prostate cancer screening    Encounter for hepatitis C screening test for low risk patient    Encounter for screening for HIV    Need for Tdap vaccination  -     Tdap Vaccine                Plan:     Problem List Items Addressed This Visit        Renal/    Prostate cancer screening    Overview     Check psa            ID    Encounter for hepatitis C screening test for low risk patient    Overview     Hep c screen         Encounter for screening for HIV    Overview     hiv screen             Other    Annual physical exam - Primary    Overview     pe documented needs full screening labs will make recs when all reviewed            Other Visit Diagnoses     Need for Tdap vaccination              As above, continue current medications and maintain follow up with specialists.  Return to clinic in 6  months.      Frederick W Dantagnan [facility] [place]

## 2020-10-05 ENCOUNTER — PATIENT MESSAGE (OUTPATIENT)
Dept: INTERNAL MEDICINE | Facility: CLINIC | Age: 55
End: 2020-10-05

## 2020-10-30 ENCOUNTER — PATIENT MESSAGE (OUTPATIENT)
Dept: ADMINISTRATIVE | Facility: HOSPITAL | Age: 55
End: 2020-10-30

## 2020-11-02 PROBLEM — Z00.00 ANNUAL PHYSICAL EXAM: Status: RESOLVED | Noted: 2020-07-30 | Resolved: 2020-11-02

## 2020-12-15 ENCOUNTER — TELEPHONE (OUTPATIENT)
Dept: ADMINISTRATIVE | Facility: HOSPITAL | Age: 55
End: 2020-12-15

## 2021-01-28 ENCOUNTER — OFFICE VISIT (OUTPATIENT)
Dept: FAMILY MEDICINE | Facility: CLINIC | Age: 56
End: 2021-01-28
Payer: COMMERCIAL

## 2021-01-28 VITALS
WEIGHT: 193.81 LBS | HEIGHT: 70 IN | OXYGEN SATURATION: 98 % | TEMPERATURE: 98 F | RESPIRATION RATE: 18 BRPM | SYSTOLIC BLOOD PRESSURE: 120 MMHG | DIASTOLIC BLOOD PRESSURE: 80 MMHG | BODY MASS INDEX: 27.75 KG/M2 | HEART RATE: 95 BPM

## 2021-01-28 DIAGNOSIS — F41.9 ANXIETY: ICD-10-CM

## 2021-01-28 DIAGNOSIS — F33.0 DEPRESSION, MAJOR, RECURRENT, MILD: Primary | ICD-10-CM

## 2021-01-28 DIAGNOSIS — K21.9 GASTROESOPHAGEAL REFLUX DISEASE WITHOUT ESOPHAGITIS: ICD-10-CM

## 2021-01-28 DIAGNOSIS — M25.512 ACUTE PAIN OF LEFT SHOULDER: ICD-10-CM

## 2021-01-28 PROCEDURE — 99999 PR PBB SHADOW E&M-EST. PATIENT-LVL IV: ICD-10-PCS | Mod: PBBFAC,,, | Performed by: INTERNAL MEDICINE

## 2021-01-28 PROCEDURE — 99214 PR OFFICE/OUTPT VISIT, EST, LEVL IV, 30-39 MIN: ICD-10-PCS | Mod: S$GLB,,, | Performed by: INTERNAL MEDICINE

## 2021-01-28 PROCEDURE — 99999 PR PBB SHADOW E&M-EST. PATIENT-LVL IV: CPT | Mod: PBBFAC,,, | Performed by: INTERNAL MEDICINE

## 2021-01-28 PROCEDURE — 99214 OFFICE O/P EST MOD 30 MIN: CPT | Mod: S$GLB,,, | Performed by: INTERNAL MEDICINE

## 2021-01-28 RX ORDER — IBUPROFEN 800 MG/1
800 TABLET ORAL 3 TIMES DAILY
Qty: 30 TABLET | Refills: 1 | Status: SHIPPED | OUTPATIENT
Start: 2021-01-28

## 2021-04-05 ENCOUNTER — PATIENT MESSAGE (OUTPATIENT)
Dept: ADMINISTRATIVE | Facility: HOSPITAL | Age: 56
End: 2021-04-05

## 2021-04-15 ENCOUNTER — PATIENT MESSAGE (OUTPATIENT)
Dept: RESEARCH | Facility: HOSPITAL | Age: 56
End: 2021-04-15

## 2021-07-15 ENCOUNTER — PATIENT OUTREACH (OUTPATIENT)
Dept: ADMINISTRATIVE | Facility: HOSPITAL | Age: 56
End: 2021-07-15

## 2021-07-15 ENCOUNTER — TELEPHONE (OUTPATIENT)
Dept: ADMINISTRATIVE | Facility: HOSPITAL | Age: 56
End: 2021-07-15

## 2021-07-19 ENCOUNTER — PATIENT OUTREACH (OUTPATIENT)
Dept: ADMINISTRATIVE | Facility: HOSPITAL | Age: 56
End: 2021-07-19

## 2021-07-19 ENCOUNTER — TELEPHONE (OUTPATIENT)
Dept: ADMINISTRATIVE | Facility: HOSPITAL | Age: 56
End: 2021-07-19

## 2021-07-29 ENCOUNTER — OFFICE VISIT (OUTPATIENT)
Dept: FAMILY MEDICINE | Facility: CLINIC | Age: 56
End: 2021-07-29
Payer: COMMERCIAL

## 2021-07-29 VITALS
HEART RATE: 78 BPM | RESPIRATION RATE: 18 BRPM | DIASTOLIC BLOOD PRESSURE: 84 MMHG | TEMPERATURE: 98 F | BODY MASS INDEX: 26.28 KG/M2 | SYSTOLIC BLOOD PRESSURE: 122 MMHG | OXYGEN SATURATION: 98 % | HEIGHT: 70 IN | WEIGHT: 183.56 LBS

## 2021-07-29 DIAGNOSIS — Z00.00 ANNUAL PHYSICAL EXAM: Primary | ICD-10-CM

## 2021-07-29 DIAGNOSIS — Z12.5 PROSTATE CANCER SCREENING: ICD-10-CM

## 2021-07-29 DIAGNOSIS — R21 RASH: ICD-10-CM

## 2021-07-29 PROCEDURE — 99396 PREV VISIT EST AGE 40-64: CPT | Mod: S$GLB,,, | Performed by: INTERNAL MEDICINE

## 2021-07-29 PROCEDURE — 99999 PR PBB SHADOW E&M-EST. PATIENT-LVL IV: CPT | Mod: PBBFAC,,, | Performed by: INTERNAL MEDICINE

## 2021-07-29 PROCEDURE — 99396 PR PREVENTIVE VISIT,EST,40-64: ICD-10-PCS | Mod: S$GLB,,, | Performed by: INTERNAL MEDICINE

## 2021-07-29 PROCEDURE — 99999 PR PBB SHADOW E&M-EST. PATIENT-LVL IV: ICD-10-PCS | Mod: PBBFAC,,, | Performed by: INTERNAL MEDICINE

## 2021-07-29 RX ORDER — KETOCONAZOLE 20 MG/G
CREAM TOPICAL DAILY
Qty: 30 G | Refills: 1 | Status: SHIPPED | OUTPATIENT
Start: 2021-07-29

## 2021-08-06 LAB
ALBUMIN: 4.9 GRAM/DL (ref 3.5–5)
ALP SERPL-CCNC: 53 UNIT/L (ref 38–126)
ALT SERPL W P-5'-P-CCNC: 34 UNIT/L (ref 7–56)
ANION GAP SERPL CALC-SCNC: 16 MEQ/L (ref 9–18)
AST SERPL-CCNC: 20 UNIT/L (ref 7–40)
BASOPHILS ABSOLUTE COUNT: 0 K/UL (ref 0–0.2)
BASOPHILS NFR BLD: 0.6 % (ref 0–2)
BILIRUB SERPL-MCNC: 1 MG/DL (ref 0–1.2)
BUN BLD-MCNC: 17 MG/DL (ref 7–21)
BUN/CREAT SERPL: 15 RATIO (ref 6–22)
CALC OSMOLALITY: 283 MOSM/KG (ref 275–295)
CALCIUM SERPL-MCNC: 10.3 MG/DL (ref 8.5–10.4)
CHLORIDE SERPL-SCNC: 103 MEQ/L (ref 98–107)
CHOL/HDLC RATIO: 3
CHOLEST SERPL-MSCNC: 171 MG/DL (ref 100–200)
CO2 SERPL-SCNC: 27 MEQ/L (ref 21–31)
CREAT SERPL-MCNC: 1.1 MG/DL (ref 0.7–1.2)
DIFFERENTIAL TYPE: NORMAL
EOSINOPHIL NFR BLD: 2.4 % (ref 0–4)
EOSINOPHILS ABSOLUTE COUNT: 0.1 K/UL (ref 0–0.7)
ERYTHROCYTE [DISTWIDTH] IN BLOOD BY AUTOMATED COUNT: 13.2 % (ref 12–15.3)
GFR: 69 ML/MIN/1.73M2
GLUCOSE SERPL-MCNC: 106 MG/DL (ref 70–100)
HCT VFR BLD AUTO: 41.6 % (ref 40–52)
HDLC SERPL-MCNC: 57 MG/DL (ref 40–75)
HGB BLD-MCNC: 14 GRAM/DL (ref 13.6–17.5)
LDLC SERPL CALC-MCNC: 101 MG/DL (ref 0–130)
LYMPHOCYTES %: 36.9 % (ref 15–45)
LYMPHOCYTES ABSOLUTE COUNT: 2.1 K/UL (ref 1–4.2)
MCH RBC QN AUTO: 30.5 PICOGRAM (ref 27–33)
MCHC RBC AUTO-ENTMCNC: 33.8 GRAM/DL (ref 32–36)
MCV RBC AUTO: 90.4 FEMTOLITER (ref 80–94)
MONOCYTES %: 7.3 % (ref 3–13)
MONOCYTES ABSOLUTE COUNT: 0.4 K/UL (ref 0.1–0.8)
NEUTROPHILS ABSOLUTE COUNT: 3 K/UL (ref 2.1–7.6)
NEUTROPHILS RELATIVE PERCENT: 52.8 % (ref 32–80)
NONHDLC SERPL-MCNC: 114 MG/DL (ref 60–125)
PLATELET # BLD AUTO: 242 K/UL (ref 150–350)
PMV BLD AUTO: 8.3 FEMTOLITER (ref 7–10.2)
POTASSIUM SERPL-SCNC: 4.9 MEQ/L (ref 3.5–5)
PROSTATE SPECIFIC ANTIGEN, TOTAL: 2.34 NANOGRAM/ML (ref 0–3.99)
RBC # BLD AUTO: 4.6 MIL/UL (ref 4.45–5.9)
SODIUM BLD-SCNC: 141 MEQ/L (ref 135–145)
TOTAL PROTEIN: 7.2 GRAM/DL (ref 6.3–8.2)
TRIGL SERPL-MCNC: 79 MG/DL (ref 30–150)
WBC # BLD AUTO: 5.8 K/UL (ref 4.5–11)

## 2021-08-09 ENCOUNTER — TELEPHONE (OUTPATIENT)
Dept: FAMILY MEDICINE | Facility: CLINIC | Age: 56
End: 2021-08-09

## 2021-11-01 PROBLEM — Z00.00 ANNUAL PHYSICAL EXAM: Status: RESOLVED | Noted: 2020-07-30 | Resolved: 2021-11-01

## 2022-01-04 RX ORDER — BUPROPION HYDROCHLORIDE 150 MG/1
TABLET ORAL
Qty: 90 TABLET | Refills: 1 | Status: SHIPPED | OUTPATIENT
Start: 2022-01-04 | End: 2022-05-09

## 2022-01-04 NOTE — TELEPHONE ENCOUNTER
Refill Authorization Note   Noel Sage  is requesting a refill authorization.  Brief Assessment and Rationale for Refill:  Approve     Medication Therapy Plan:       Medication Reconciliation Completed: No   Comments:   --->Care Gap information included below if applicable.   Orders Placed This Encounter    buPROPion (WELLBUTRIN XL) 150 MG TB24 tablet      Requested Prescriptions   Signed Prescriptions Disp Refills    buPROPion (WELLBUTRIN XL) 150 MG TB24 tablet 90 tablet 1     Sig: TAKE 1 TABLET(150 MG) BY MOUTH EVERY DAY       Psychiatry: Antidepressants - bupropion Passed - 1/4/2022 10:09 AM        Passed - Patient is at least 18 years old        Passed - Last BP in normal range within 360 days     BP Readings from Last 1 Encounters:   07/29/21 122/84               Passed - Valid encounter within last 15 months     Recent Visits  Date Type Provider Dept   07/29/21 Office Visit Tito Espinoza MD [facility]   01/28/21 Office Visit Tito Espinoza MD [facility]   07/30/20 Office Visit Tito Espinoza MD [facility]   01/30/20 Office Visit Tito Espinoza MD [facility]   Showing recent visits within past 720 days and meeting all other requirements  Future Appointments  No visits were found meeting these conditions.  Showing future appointments within next 150 days and meeting all other requirements      Future Appointments              In 3 weeks Tito Espinoza MD [facility] [address] [place] [address]                    Appointments  past 12m or future 3m with PCP    Date Provider   Last Visit   7/29/2021 Tito Espinoza MD   Next Visit   1/28/2022 Tito Espinoza MD   ED visits in past 90 days: 0     Note composed:1:11 PM 01/04/2022

## 2022-01-04 NOTE — TELEPHONE ENCOUNTER
No new care gaps identified.  Powered by [employer]. Reference number: [id number].   1/04/2022 10:09:59 AM CST

## 2022-05-09 RX ORDER — BUPROPION HYDROCHLORIDE 150 MG/1
TABLET ORAL
Qty: 90 TABLET | Refills: 1 | Status: SHIPPED | OUTPATIENT
Start: 2022-05-09 | End: 2023-02-24

## 2022-09-20 ENCOUNTER — OFFICE VISIT (OUTPATIENT)
Dept: INTERNAL MEDICINE | Facility: CLINIC | Age: 57
End: 2022-09-20
Payer: COMMERCIAL

## 2022-09-20 VITALS
HEART RATE: 81 BPM | RESPIRATION RATE: 18 BRPM | HEIGHT: 70 IN | SYSTOLIC BLOOD PRESSURE: 120 MMHG | OXYGEN SATURATION: 98 % | BODY MASS INDEX: 27.27 KG/M2 | TEMPERATURE: 98 F | WEIGHT: 190.5 LBS | DIASTOLIC BLOOD PRESSURE: 70 MMHG

## 2022-09-20 DIAGNOSIS — Z12.5 PROSTATE CANCER SCREENING: ICD-10-CM

## 2022-09-20 DIAGNOSIS — Z00.00 ANNUAL PHYSICAL EXAM: Primary | ICD-10-CM

## 2022-09-20 PROCEDURE — 99999 PR PBB SHADOW E&M-EST. PATIENT-LVL IV: CPT | Mod: PBBFAC,,, | Performed by: INTERNAL MEDICINE

## 2022-09-20 PROCEDURE — 99396 PR PREVENTIVE VISIT,EST,40-64: ICD-10-PCS | Mod: S$GLB,,, | Performed by: INTERNAL MEDICINE

## 2022-09-20 PROCEDURE — 99999 PR PBB SHADOW E&M-EST. PATIENT-LVL IV: ICD-10-PCS | Mod: PBBFAC,,, | Performed by: INTERNAL MEDICINE

## 2022-09-20 PROCEDURE — 99396 PREV VISIT EST AGE 40-64: CPT | Mod: S$GLB,,, | Performed by: INTERNAL MEDICINE

## 2022-09-20 NOTE — PROGRESS NOTES
[facility] Note    Chief Complaint      Chief Complaint   Patient presents with    Annual Exam       History of Present Illness      Noel Sage Jr. is a 56 y.o. male who presents today for   Chief Complaint   Patient presents with    Annual Exam   .  Patient comes to appointment here for annual preventative visit with me . He is feeling great currently . He is getting his regular exercise and is eating fairly healthy diet . He has received covid vaccine x 2 . He is due for repeat colonoscopy in 2023. He is complaining of feeling tired .     HPI    No problem-specific Assessment & Plan notes found for this encounter.       Problem List Items Addressed This Visit          Renal/    Prostate cancer screening    Overview     Check psa            Other    Annual physical exam - Primary    Overview     pe documented needs full screening labs will make recs when all reviewed              Past Medical History:  Past Medical History:   Diagnosis Date    GERD (gastroesophageal reflux disease)     Seasonal allergies        Past Surgical History:  Past Surgical History:   Procedure Laterality Date    COLONOSCOPY  10/11/2013    ESOPHAGOGASTRODUODENOSCOPY      LEG SURGERY Left     torn muscle, repaired, age 18's       Family History:  family history is not on file.     Social History:  Social History     Socioeconomic History    Marital status:    Tobacco Use    Smoking status: Some Days     Types: Cigars    Smokeless tobacco: Never   Substance and Sexual Activity    Alcohol use: Yes     Comment: on weekends occasionally    Drug use: No       Review of Systems:   Review of Systems   Constitutional:  Negative for fever and weight loss.   HENT:  Negative for congestion, hearing loss and sore throat.    Eyes:  Negative for blurred vision.   Respiratory:  Negative for cough and shortness of breath.    Cardiovascular:  Negative for chest pain, palpitations, claudication and leg swelling.    Gastrointestinal:  Negative for abdominal pain, constipation, diarrhea, heartburn, nausea and vomiting.   Genitourinary:  Negative for dysuria.   Musculoskeletal:  Negative for back pain and myalgias.   Skin:  Negative for rash.   Neurological:  Negative for focal weakness and headaches.   Psychiatric/Behavioral:  Negative for depression, memory loss and suicidal ideas. The patient is not nervous/anxious.       Medications:  Outpatient Encounter Medications as of 9/20/2022   Medication Sig Note Dispense Refill    acetaminophen (TYLENOL) 500 MG tablet Take 500 mg by mouth every 6 (six) hours as needed for Pain.       ALLEGRA-D 12 HOUR  mg per tablet  3/10/2017: Received from: External Pharmacy  2    buPROPion (WELLBUTRIN XL) 150 MG TB24 tablet TAKE 1 TABLET(150 MG) BY MOUTH EVERY DAY  90 tablet 1    ibuprofen (ADVIL,MOTRIN) 800 MG tablet Take 1 tablet (800 mg total) by mouth 3 (three) times daily.  30 tablet 1    ketoconazole (NIZORAL) 2 % cream Apply topically once daily.  30 g 1    LACTOBACILLUS ACIDOPHILUS (PROBIOTIC ORAL) Take by mouth once daily.       psyllium (METAMUCIL) packet Take 1 packet by mouth once daily.       rabeprazole (ACIPHEX) 20 mg tablet TK 1 T PO QD 3/21/2017: Post op  2    sulfacetamide sodium-sulfur 10-5 % (w/w) Clsr  3/10/2017: Received from: External Pharmacy  1    [DISCONTINUED] brompheniramine-pseudoeph-DM 2-30-10 mg/5 mL Syrp TK 1 TO 2 TEA PO Q 6 H PRN CNC 3/10/2017: Received from: External Pharmacy  5    [DISCONTINUED] promethazine (PHENERGAN) 25 MG tablet Take 1 tablet (25 mg total) by mouth every 6 (six) hours as needed for Nausea. (Patient not taking: Reported on 9/20/2022) 3/21/2017: Post op 30 tablet 0     No facility-administered encounter medications on file as of 9/20/2022.       Allergies:  Review of patient's allergies indicates:   Allergen Reactions    Penicillins      UNKNOWN         Physical Exam      Vitals:    09/20/22 1615   BP: 120/70   Pulse: 81   Resp: 18  "  Temp: 97.8 °F (36.6 °C)        Vital Signs  Temp: 97.8 °F (36.6 °C)  Temp src: Oral  Pulse: 81  Resp: 18  SpO2: 98 %  BP: 120/70  BP Location: Right arm  Patient Position: Sitting  Pain Score: 0-No pain  Height and Weight  Height: 5' 10" (177.8 cm)  Weight: 86.4 kg (190 lb 7.6 oz)  BSA (Calculated - sq m): 2.07 sq meters  BMI (Calculated): 27.3  Weight in (lb) to have BMI = 25: 173.9]     Body mass index is 27.33 kg/m².    Physical Exam  Constitutional:       Appearance: He is well-developed.   HENT:      Head: Normocephalic.   Eyes:      Pupils: Pupils are equal, round, and reactive to light.   Neck:      Thyroid: No thyromegaly.   Cardiovascular:      Rate and Rhythm: Normal rate and regular rhythm.      Heart sounds: No murmur heard.    No friction rub. No gallop.   Pulmonary:      Effort: Pulmonary effort is normal.      Breath sounds: Normal breath sounds.   Abdominal:      General: Bowel sounds are normal.      Palpations: Abdomen is soft.   Musculoskeletal:         General: Normal range of motion.      Cervical back: Normal range of motion.   Skin:     General: Skin is warm and dry.   Neurological:      Mental Status: He is alert and oriented to person, place, and time.      Sensory: No sensory deficit.   Psychiatric:         Behavior: Behavior normal.        Laboratory:  CBC:  No results for input(s): WBC, RBC, HGB, HCT, PLT, MCV, MCH, MCHC in the last 2160 hours.  CMP:  No results for input(s): GLU, CALCIUM, ALBUMIN, PROT, NA, K, CO2, CL, BUN, ALKPHOS, ALT, AST, BILITOT in the last 2160 hours.    Invalid input(s): CREATININ  URINALYSIS:  No results for input(s): COLORU, CLARITYU, SPECGRAV, PHUR, PROTEINUA, GLUCOSEU, BILIRUBINCON, BLOODU, WBCU, RBCU, BACTERIA, MUCUS, NITRITE, LEUKOCYTESUR, UROBILINOGEN, HYALINECASTS in the last 2160 hours.   LIPIDS:  No results for input(s): TSH, HDL, CHOL, TRIG, LDLCALC, CHOLHDL, NONHDLCHOL, TOTALCHOLEST in the last 2160 hours.  TSH:  No results for input(s): TSH in the " last 2160 hours.  A1C:  No results for input(s): HGBA1C in the last 2160 hours.    Radiology:        Assessment:     Noel Sage Jr. is a 56 y.o.male with:    Annual physical exam  -     CBC Auto Differential; Future; Expected date: 09/20/2022  -     Comprehensive Metabolic Panel; Future; Expected date: 09/20/2022  -     Lipid Panel; Future; Expected date: 09/20/2022  -     Testosterone, Total, Males; Future; Expected date: 09/20/2022    Prostate cancer screening  -     PSA, Screening; Future; Expected date: 09/20/2022              Plan:     Problem List Items Addressed This Visit          Renal/    Prostate cancer screening    Overview     Check psa            Other    Annual physical exam - Primary    Overview     pe documented needs full screening labs will make recs when all reviewed             As above, continue current medications and maintain follow up with specialists.  Return to clinic in 12 months.      Frederick W Dantagnan [facility] [place]

## 2022-09-28 LAB
ALBUMIN SERPL-MCNC: 4.6 G/DL (ref 3.6–5.1)
ALBUMIN/GLOB SERPL: 1.9 (CALC) (ref 1–2.5)
ALP SERPL-CCNC: 44 U/L (ref 35–144)
ALT SERPL-CCNC: 17 U/L (ref 9–46)
AST SERPL-CCNC: 18 U/L (ref 10–35)
BASOPHILS # BLD AUTO: 41 CELLS/UL (ref 0–200)
BASOPHILS NFR BLD AUTO: 0.7 %
BILIRUB SERPL-MCNC: 1.1 MG/DL (ref 0.2–1.2)
BUN SERPL-MCNC: 20 MG/DL (ref 7–25)
BUN/CREAT SERPL: ABNORMAL (CALC) (ref 6–22)
CALCIUM SERPL-MCNC: 9.6 MG/DL (ref 8.6–10.3)
CHLORIDE SERPL-SCNC: 105 MMOL/L (ref 98–110)
CHOLEST SERPL-MCNC: 182 MG/DL
CHOLEST/HDLC SERPL: 2.8 (CALC)
CO2 SERPL-SCNC: 26 MMOL/L (ref 20–32)
CREAT SERPL-MCNC: 1.1 MG/DL (ref 0.7–1.3)
EGFR: 78 ML/MIN/1.73M2
EOSINOPHIL # BLD AUTO: 151 CELLS/UL (ref 15–500)
EOSINOPHIL NFR BLD AUTO: 2.6 %
ERYTHROCYTE [DISTWIDTH] IN BLOOD BY AUTOMATED COUNT: 12.3 % (ref 11–15)
GLOBULIN SER CALC-MCNC: 2.4 G/DL (CALC) (ref 1.9–3.7)
GLUCOSE SERPL-MCNC: 104 MG/DL (ref 65–99)
HCT VFR BLD AUTO: 43 % (ref 38.5–50)
HDLC SERPL-MCNC: 65 MG/DL
HGB BLD-MCNC: 14 G/DL (ref 13.2–17.1)
LDLC SERPL CALC-MCNC: 99 MG/DL (CALC)
LYMPHOCYTES # BLD AUTO: 2053 CELLS/UL (ref 850–3900)
LYMPHOCYTES NFR BLD AUTO: 35.4 %
MCH RBC QN AUTO: 30.3 PG (ref 27–33)
MCHC RBC AUTO-ENTMCNC: 32.6 G/DL (ref 32–36)
MCV RBC AUTO: 93.1 FL (ref 80–100)
MONOCYTES # BLD AUTO: 464 CELLS/UL (ref 200–950)
MONOCYTES NFR BLD AUTO: 8 %
NEUTROPHILS # BLD AUTO: 3091 CELLS/UL (ref 1500–7800)
NEUTROPHILS NFR BLD AUTO: 53.3 %
NONHDLC SERPL-MCNC: 117 MG/DL (CALC)
PLATELET # BLD AUTO: 208 THOUSAND/UL (ref 140–400)
PMV BLD REES-ECKER: 10.5 FL (ref 7.5–12.5)
POTASSIUM SERPL-SCNC: 4.6 MMOL/L (ref 3.5–5.3)
PROT SERPL-MCNC: 7 G/DL (ref 6.1–8.1)
PSA SERPL-MCNC: 2.09 NG/ML
RBC # BLD AUTO: 4.62 MILLION/UL (ref 4.2–5.8)
SODIUM SERPL-SCNC: 139 MMOL/L (ref 135–146)
TESTOST SERPL-MCNC: 614 NG/DL (ref 250–827)
TRIGL SERPL-MCNC: 85 MG/DL
WBC # BLD AUTO: 5.8 THOUSAND/UL (ref 3.8–10.8)

## 2022-12-26 PROBLEM — Z00.00 ANNUAL PHYSICAL EXAM: Status: RESOLVED | Noted: 2020-07-30 | Resolved: 2022-12-26

## 2023-02-24 RX ORDER — BUPROPION HYDROCHLORIDE 150 MG/1
TABLET ORAL
Qty: 90 TABLET | Refills: 1 | Status: SHIPPED | OUTPATIENT
Start: 2023-02-24 | End: 2023-10-12

## 2023-02-24 NOTE — TELEPHONE ENCOUNTER
Refill Decision Note   Noel Sage  is requesting a refill authorization.  Brief Assessment and Rationale for Refill:  Approve     Medication Therapy Plan:       Medication Reconciliation Completed: No   Comments:     No Care Gaps recommended.     Note composed:10:07 AM 02/24/2023

## 2023-02-24 NOTE — TELEPHONE ENCOUNTER
No new care gaps identified.  [facility] Embedded Care Gaps. Reference number: [id number]. 2/24/2023   5:44:53 AM CST

## 2023-03-08 ENCOUNTER — PATIENT MESSAGE (OUTPATIENT)
Dept: PRIMARY CARE CLINIC | Facility: CLINIC | Age: 58
End: 2023-03-08
Payer: COMMERCIAL

## 2023-03-20 ENCOUNTER — OFFICE VISIT (OUTPATIENT)
Dept: PRIMARY CARE CLINIC | Facility: CLINIC | Age: 58
End: 2023-03-20
Payer: COMMERCIAL

## 2023-03-20 VITALS
SYSTOLIC BLOOD PRESSURE: 120 MMHG | RESPIRATION RATE: 18 BRPM | TEMPERATURE: 98 F | DIASTOLIC BLOOD PRESSURE: 82 MMHG | WEIGHT: 192.88 LBS | OXYGEN SATURATION: 97 % | BODY MASS INDEX: 27.61 KG/M2 | HEART RATE: 80 BPM | HEIGHT: 70 IN

## 2023-03-20 DIAGNOSIS — R91.1 SOLITARY PULMONARY NODULE: Primary | ICD-10-CM

## 2023-03-20 DIAGNOSIS — F33.0 DEPRESSION, MAJOR, RECURRENT, MILD: ICD-10-CM

## 2023-03-20 DIAGNOSIS — F41.9 ANXIETY: ICD-10-CM

## 2023-03-20 PROCEDURE — 99999 PR PBB SHADOW E&M-EST. PATIENT-LVL IV: CPT | Mod: PBBFAC,,, | Performed by: INTERNAL MEDICINE

## 2023-03-20 PROCEDURE — 99999 PR PBB SHADOW E&M-EST. PATIENT-LVL IV: ICD-10-PCS | Mod: PBBFAC,,, | Performed by: INTERNAL MEDICINE

## 2023-03-20 PROCEDURE — 99214 OFFICE O/P EST MOD 30 MIN: CPT | Mod: S$GLB,,, | Performed by: INTERNAL MEDICINE

## 2023-03-20 PROCEDURE — 99214 PR OFFICE/OUTPT VISIT, EST, LEVL IV, 30-39 MIN: ICD-10-PCS | Mod: S$GLB,,, | Performed by: INTERNAL MEDICINE

## 2023-03-20 NOTE — PROGRESS NOTES
[facility] Note    Chief Complaint      Chief Complaint   Patient presents with    Follow-up     6m        History of Present Illness      Noel Sage Jr. is a 57 y.o. male who presents today for   Chief Complaint   Patient presents with    Follow-up     6m    .  Patient comes to appointment here for 6 m checkup for chronic issues as below .had episode of chest pain had visit with dr conrad cardiology had full workup including stress test , ct calcium score all wnl. He is stable on his current anxiety / depression  regimen . Did find incidental small spn less than 5 mm on ct angio . Will f/u in 6 m    HPI    No problem-specific Assessment & Plan notes found for this encounter.       Problem List Items Addressed This Visit          Psychiatric    Anxiety    Overview     See above          Depression, major, recurrent, mild    Overview     Cont welbutrin as prescribed             Pulmonary    Solitary pulmonary nodule - Primary    Overview     3/6/23 found incidentally on ct calcium score . Will repeat cxr in 9/23              Past Medical History:  Past Medical History:   Diagnosis Date    GERD (gastroesophageal reflux disease)     Seasonal allergies        Past Surgical History:  Past Surgical History:   Procedure Laterality Date    COLONOSCOPY  10/11/2013    ESOPHAGOGASTRODUODENOSCOPY      LEG SURGERY Left     torn muscle, repaired, age 18's       Family History:  family history is not on file.     Social History:  Social History     Socioeconomic History    Marital status:    Tobacco Use    Smoking status: Some Days     Types: Cigars    Smokeless tobacco: Never   Substance and Sexual Activity    Alcohol use: Yes     Comment: on weekends occasionally    Drug use: No       Review of Systems:   Review of Systems   Constitutional:  Negative for fever and weight loss.   HENT:  Negative for congestion, hearing loss and sore throat.    Eyes:  Negative for blurred vision.    Respiratory:  Negative for cough and shortness of breath.    Cardiovascular:  Negative for chest pain, palpitations, claudication and leg swelling.   Gastrointestinal:  Negative for abdominal pain, constipation, diarrhea and heartburn.   Genitourinary:  Negative for dysuria.   Musculoskeletal:  Negative for back pain and myalgias.   Skin:  Negative for rash.   Neurological:  Negative for focal weakness and headaches.   Psychiatric/Behavioral:  Negative for depression and suicidal ideas. The patient is not nervous/anxious.       Medications:  Outpatient Encounter Medications as of 3/20/2023   Medication Sig Note Dispense Refill    acetaminophen (TYLENOL) 500 MG tablet Take 500 mg by mouth every 6 (six) hours as needed for Pain.       ALLEGRA-D 12 HOUR  mg per tablet  3/10/2017: Received from: External Pharmacy  2    buPROPion (WELLBUTRIN XL) 150 MG TB24 tablet TAKE 1 TABLET(150 MG) BY MOUTH EVERY DAY  90 tablet 1    ibuprofen (ADVIL,MOTRIN) 800 MG tablet Take 1 tablet (800 mg total) by mouth 3 (three) times daily.  30 tablet 1    ketoconazole (NIZORAL) 2 % cream Apply topically once daily.  30 g 1    LACTOBACILLUS ACIDOPHILUS (PROBIOTIC ORAL) Take by mouth once daily.       psyllium (METAMUCIL) packet Take 1 packet by mouth once daily.       rabeprazole (ACIPHEX) 20 mg tablet TK 1 T PO QD 3/21/2017: Post op  2    sulfacetamide sodium-sulfur 10-5 % (w/w) Clsr  3/10/2017: Received from: External Pharmacy  1     No facility-administered encounter medications on file as of 3/20/2023.       Allergies:  Review of patient's allergies indicates:   Allergen Reactions    Penicillins      UNKNOWN         Physical Exam      Vitals:    03/20/23 1515   BP: 120/82   Pulse: 80   Resp: 18   Temp: 98.2 °F (36.8 °C)        Vital Signs  Temp: 98.2 °F (36.8 °C)  Temp Source: Oral  Pulse: 80  Resp: 18  SpO2: 97 %  BP: 120/82  BP Location: Right arm  Patient Position: Sitting  Pain Score: 0-No pain  Height and Weight  Height: 5'  "10" (177.8 cm)  Weight: 87.5 kg (192 lb 14.4 oz)  BSA (Calculated - sq m): 2.08 sq meters  BMI (Calculated): 27.7  Weight in (lb) to have BMI = 25: 173.9]     Body mass index is 27.68 kg/m².    Physical Exam  Constitutional:       Appearance: He is well-developed.   HENT:      Head: Normocephalic.   Eyes:      Pupils: Pupils are equal, round, and reactive to light.   Neck:      Thyroid: No thyromegaly.   Cardiovascular:      Rate and Rhythm: Normal rate and regular rhythm.      Heart sounds: No murmur heard.    No friction rub. No gallop.   Pulmonary:      Effort: Pulmonary effort is normal.      Breath sounds: Normal breath sounds.   Abdominal:      General: Bowel sounds are normal.      Palpations: Abdomen is soft.   Musculoskeletal:         General: Normal range of motion.      Cervical back: Normal range of motion.   Skin:     General: Skin is warm and dry.   Neurological:      Mental Status: He is alert and oriented to person, place, and time.      Sensory: No sensory deficit.   Psychiatric:         Behavior: Behavior normal.        Laboratory:  CBC:  No results for input(s): WBC, RBC, HGB, HCT, PLT, MCV, MCH, MCHC in the last 2160 hours.  CMP:  No results for input(s): GLU, CALCIUM, ALBUMIN, PROT, NA, K, CO2, CL, BUN, ALKPHOS, ALT, AST, BILITOT in the last 2160 hours.    Invalid input(s): CREATININ  URINALYSIS:  No results for input(s): COLORU, CLARITYU, SPECGRAV, PHUR, PROTEINUA, GLUCOSEU, BILIRUBINCON, BLOODU, WBCU, RBCU, BACTERIA, MUCUS, NITRITE, LEUKOCYTESUR, UROBILINOGEN, HYALINECASTS in the last 2160 hours.   LIPIDS:  No results for input(s): TSH, HDL, CHOL, TRIG, LDLCALC, CHOLHDL, NONHDLCHOL, TOTALCHOLEST in the last 2160 hours.  TSH:  No results for input(s): TSH in the last 2160 hours.  A1C:  No results for input(s): HGBA1C in the last 2160 hours.    Radiology:        Assessment:     Noel Sage JrOpal is a 57 y.o.male with:    Solitary pulmonary nodule    Depression, major, recurrent, " mild    Anxiety                Plan:     Problem List Items Addressed This Visit          Psychiatric    Anxiety    Overview     See above          Depression, major, recurrent, mild    Overview     Cont welbutrin as prescribed             Pulmonary    Solitary pulmonary nodule - Primary    Overview     3/6/23 found incidentally on ct calcium score . Will repeat cxr in 9/23             As above, continue current medications and maintain follow up with specialists.  Return to clinic in 6 months.      Frederick W Dantagnan [facility]

## 2023-04-11 ENCOUNTER — PATIENT MESSAGE (OUTPATIENT)
Dept: PRIMARY CARE CLINIC | Facility: CLINIC | Age: 58
End: 2023-04-11
Payer: COMMERCIAL

## 2023-04-24 ENCOUNTER — E-VISIT (OUTPATIENT)
Dept: PRIMARY CARE CLINIC | Facility: CLINIC | Age: 58
End: 2023-04-24
Payer: COMMERCIAL

## 2023-04-24 ENCOUNTER — PATIENT MESSAGE (OUTPATIENT)
Dept: PRIMARY CARE CLINIC | Facility: CLINIC | Age: 58
End: 2023-04-24

## 2023-04-24 DIAGNOSIS — J06.9 VIRAL URI WITH COUGH: Primary | ICD-10-CM

## 2023-04-24 PROCEDURE — 99421 PR E&M, ONLINE DIGIT, EST, < 7 DAYS, 5-10 MINS: ICD-10-PCS | Mod: 95,,, | Performed by: INTERNAL MEDICINE

## 2023-04-24 PROCEDURE — 99421 OL DIG E/M SVC 5-10 MIN: CPT | Mod: 95,,, | Performed by: INTERNAL MEDICINE

## 2023-04-24 RX ORDER — PROMETHAZINE HYDROCHLORIDE AND CODEINE PHOSPHATE 6.25; 1 MG/5ML; MG/5ML
5 SOLUTION ORAL EVERY 8 HOURS PRN
Qty: 118 ML | Refills: 0 | Status: SHIPPED | OUTPATIENT
Start: 2023-04-24 | End: 2023-05-04

## 2023-04-24 NOTE — PROGRESS NOTES
Patient ID: Noel Sage Jr. is a 57 y.o. male.    Chief Complaint: No chief complaint on file.    The patient initiated a request through [employer] on 4/24/2023 for evaluation and management with a chief complaint of No chief complaint on file.     I evaluated the questionnaire submission on 04/24/2023.    [facility] Peq Evisit Upper Respitatory/Cough Questionnaire    4/24/2023  1:18 PM CDT - Filed by Patient   Do you agree to participate in an E-Visit? Yes   If you have any of the following symptoms, please present to your local ER or call 911:  I acknowledge   What is the main issue that you would like for your doctor to address today? Cough   Are you able to take your vital signs? No   What symptoms do you currently have?  Cough   Have you had a fever? No   When did your symptoms first appear? 4/17/2023   In the last two weeks, have you been in close contact with someone who has COVID-19 or the Flu? No   In the last two weeks, have you worked or volunteered in a healthcare facility or as a ? Healthcare facilities include a hospital, medical or dental clinic, long-term care facility, or nursing home No   Do you live in a long-term care facility, nursing home, group home, or homeless shelter? No   List what you have done or taken to help your symptoms. Allergra   How severe are your symptoms? Moderate   Have you taken an at home Covid test? No   Have you taken a Flu test? No   Have you been fully vaccinated for COVID? (2 Pfizer, 2 Moderna or 1 Jose & Jose vaccine injections) Yes   Have you received a booster? No   Have you recieved a Flu shot? Yes   When did you recieve your Flu shot? 10/14/2022   Do you have transportation to get tested for COVID if it is indicated and ordered for you at an [facility] location? No   Provide any information you feel is important to your history not asked above    Please attach any relevant images or files          Recent Labs Obtained:  No visits with results within 7  Day(s) from this visit.   Latest known visit with results is:   Office Visit on 09/20/2022   Component Date Value Ref Range Status    WBC 09/26/2022 5.8  3.8 - 10.8 Thousand/uL Final    RBC 09/26/2022 4.62  4.20 - 5.80 Million/uL Final    Hemoglobin 09/26/2022 14.0  13.2 - 17.1 g/dL Final    Hematocrit 09/26/2022 43.0  38.5 - 50.0 % Final    MCV 09/26/2022 93.1  80.0 - 100.0 fL Final    MCH 09/26/2022 30.3  27.0 - 33.0 pg Final    MCHC 09/26/2022 32.6  32.0 - 36.0 g/dL Final    RDW 09/26/2022 12.3  11.0 - 15.0 % Final    Platelets 09/26/2022 208  140 - 400 Thousand/uL Final    MPV 09/26/2022 10.5  7.5 - 12.5 fL Final    Neutrophils, Abs 09/26/2022 3,091  1,500 - 7,800 cells/uL Final    Lymph # 09/26/2022 2,053  850 - 3,900 cells/uL Final    Mono # 09/26/2022 464  200 - 950 cells/uL Final    Eos # 09/26/2022 151  15 - 500 cells/uL Final    Baso # 09/26/2022 41  0 - 200 cells/uL Final    Neutrophils Relative 09/26/2022 53.3  % Final    Lymph % 09/26/2022 35.4  % Final    Mono % 09/26/2022 8.0  % Final    Eosinophil % 09/26/2022 2.6  % Final    Basophil % 09/26/2022 0.7  % Final    Glucose 09/26/2022 104 (H)  65 - 99 mg/dL Final    Comment:               Fasting reference interval     For someone without known diabetes, a glucose value  between 100 and 125 mg/dL is consistent with  prediabetes and should be confirmed with a  follow-up test.         BUN 09/26/2022 20  7 - 25 mg/dL Final    Creatinine 09/26/2022 1.10  0.70 - 1.30 mg/dL Final    eGFR 09/26/2022 78  > OR = 60 mL/min/1.73m2 Final    Comment: The eGFR is based on the CKD-EPI 2021 equation. To calculate   the new eGFR from a previous Creatinine or Cystatin C  result, go to https://www.kidney.org/professionals/  kdoqi/gfr%5Fcalculator      BUN/Creatinine Ratio 09/26/2022 NOT APPLICABLE  6 - 22 (calc) Final    Sodium 09/26/2022 139  135 - 146 mmol/L Final    Potassium 09/26/2022 4.6  3.5 - 5.3 mmol/L Final    Chloride 09/26/2022 105  98 - 110 mmol/L Final     CO2 09/26/2022 26  20 - 32 mmol/L Final    Calcium 09/26/2022 9.6  8.6 - 10.3 mg/dL Final    Total Protein 09/26/2022 7.0  6.1 - 8.1 g/dL Final    Albumin 09/26/2022 4.6  3.6 - 5.1 g/dL Final    Globulin, Total 09/26/2022 2.4  1.9 - 3.7 g/dL (calc) Final    Albumin/Globulin Ratio 09/26/2022 1.9  1.0 - 2.5 (calc) Final    Total Bilirubin 09/26/2022 1.1  0.2 - 1.2 mg/dL Final    Alkaline Phosphatase 09/26/2022 44  35 - 144 U/L Final    AST 09/26/2022 18  10 - 35 U/L Final    ALT 09/26/2022 17  9 - 46 U/L Final    Cholesterol 09/26/2022 182  <200 mg/dL Final    HDL 09/26/2022 65  > OR = 40 mg/dL Final    Triglycerides 09/26/2022 85  <150 mg/dL Final    LDL Cholesterol 09/26/2022 99  mg/dL (calc) Final    Comment: Reference range: <100     Desirable range <100 mg/dL for primary prevention;    <70 mg/dL for patients with CHD or diabetic patients   with > or = 2 CHD risk factors.     LDL-C is now calculated using the Javid-Causey   calculation, which is a validated novel method providing   better accuracy than the Friedewald equation in the   estimation of LDL-C.   Javid KOTHARI et al. SELAM. 2013;310(19): 4948-4572   (http://education.[employer] [place]/faq/UAV352)      HDL/Cholesterol Ratio 09/26/2022 2.8  <5.0 (calc) Final    Non HDL Chol. (LDL+VLDL) 09/26/2022 117  <130 mg/dL (calc) Final    Comment: For patients with diabetes plus 1 major ASCVD risk   factor, treating to a non-HDL-C goal of <100 mg/dL   (LDL-C of <70 mg/dL) is considered a therapeutic   option.      PROSTATE SPECIFIC ANTIGEN, SCR - Q* 09/26/2022 2.09  < OR = 4.00 ng/mL Final    Comment: The total PSA value from this assay system is   standardized against the WHO standard. The test   result will be approximately 20% lower when compared   to the equimolar-standardized total PSA ([place]   [place]). Comparison of serial PSA results should be   interpreted with this fact in mind.     This test was performed using the Siemens   chemiluminescent method.  Values obtained from   different assay methods cannot be used  interchangeably. PSA levels, regardless of  value, should not be interpreted as absolute  evidence of the presence or absence of disease.      TESTOSTERONE, TOTAL, MALE 09/26/2022 614  250 - 827 ng/dL Final       Encounter Diagnosis   Name Primary?    Viral URI with cough Yes        No orders of the defined types were placed in this encounter.     Medications Ordered This Encounter   Medications    promethazine-codeine 6.25-10 mg/5 ml (PHENERGAN WITH CODEINE) 6.25-10 mg/5 mL syrup     Sig: Take 5 mLs by mouth every 8 (eight) hours as needed for Cough.     Dispense:  118 mL     Refill:  0     Quantity prescribed more than 7 day supply? No     Order Specific Question:   I have reviewed the Prescription Drug Monitoring Program (PDMP) database for this patient prior to prescribing the above opioid medication     Answer:   Yes        No follow-ups on file.      E-Visit Time Tracking:    Day 1 Time (in minutes): 5     Total Time (in minutes): 5

## 2023-09-25 ENCOUNTER — OFFICE VISIT (OUTPATIENT)
Dept: PRIMARY CARE CLINIC | Facility: CLINIC | Age: 58
End: 2023-09-25
Payer: COMMERCIAL

## 2023-09-25 VITALS
HEIGHT: 70 IN | HEART RATE: 81 BPM | OXYGEN SATURATION: 97 % | BODY MASS INDEX: 27.11 KG/M2 | SYSTOLIC BLOOD PRESSURE: 118 MMHG | DIASTOLIC BLOOD PRESSURE: 80 MMHG | WEIGHT: 189.38 LBS | TEMPERATURE: 98 F | RESPIRATION RATE: 18 BRPM

## 2023-09-25 DIAGNOSIS — Z12.5 PROSTATE CANCER SCREENING: ICD-10-CM

## 2023-09-25 DIAGNOSIS — Z00.00 ANNUAL PHYSICAL EXAM: Primary | ICD-10-CM

## 2023-09-25 PROCEDURE — 99396 PR PREVENTIVE VISIT,EST,40-64: ICD-10-PCS | Mod: S$GLB,,, | Performed by: INTERNAL MEDICINE

## 2023-09-25 PROCEDURE — 99999 PR PBB SHADOW E&M-EST. PATIENT-LVL IV: CPT | Mod: PBBFAC,,, | Performed by: INTERNAL MEDICINE

## 2023-09-25 PROCEDURE — 99999 PR PBB SHADOW E&M-EST. PATIENT-LVL IV: ICD-10-PCS | Mod: PBBFAC,,, | Performed by: INTERNAL MEDICINE

## 2023-09-25 PROCEDURE — 99396 PREV VISIT EST AGE 40-64: CPT | Mod: S$GLB,,, | Performed by: INTERNAL MEDICINE

## 2023-09-25 NOTE — PROGRESS NOTES
[facility] Note    Chief Complaint      Chief Complaint   Patient presents with    Annual Exam       History of Present Illness      Noel Sage Jr. is a 57 y.o. male who presents today for   Chief Complaint   Patient presents with    Annual Exam   .  Patient comes to appointment here for annual preventative visit with me .   Need sfull screening labs with this visit . He is feeling well is getting regular exercise and is eating healthy diet as well . He has bens tables on Wellbutrin 150 mg daily . He will be getting colonoscopy next month . Dr gutierrez gi managing   HPI    No problem-specific Assessment & Plan notes found for this encounter.       Problem List Items Addressed This Visit          Renal/    Prostate cancer screening    Overview     Check psa            Other    Annual physical exam - Primary    Overview     pe documented needs full screening labs will make recs when all reviewed              Past Medical History:  Past Medical History:   Diagnosis Date    GERD (gastroesophageal reflux disease)     Seasonal allergies        Past Surgical History:  Past Surgical History:   Procedure Laterality Date    COLONOSCOPY  10/11/2013    ESOPHAGOGASTRODUODENOSCOPY      LEG SURGERY Left     torn muscle, repaired, age 18's       Family History:  family history is not on file.     Social History:  Social History     Socioeconomic History    Marital status:    Tobacco Use    Smoking status: Some Days     Types: Cigars    Smokeless tobacco: Never   Substance and Sexual Activity    Alcohol use: Yes     Comment: on weekends occasionally    Drug use: No       Review of Systems:   Review of Systems   Constitutional:  Negative for fever and weight loss.   HENT:  Negative for congestion, hearing loss and sore throat.    Eyes:  Negative for blurred vision.   Respiratory:  Negative for cough and shortness of breath.    Cardiovascular:  Negative for chest pain, palpitations, claudication  "and leg swelling.   Gastrointestinal:  Negative for abdominal pain, constipation, diarrhea and heartburn.   Genitourinary:  Negative for dysuria.   Musculoskeletal:  Negative for back pain and myalgias.   Skin:  Negative for rash.   Neurological:  Negative for focal weakness and headaches.   Psychiatric/Behavioral:  Negative for depression and suicidal ideas. The patient is not nervous/anxious.         Medications:  Outpatient Encounter Medications as of 9/25/2023   Medication Sig Note Dispense Refill    acetaminophen (TYLENOL) 500 MG tablet Take 500 mg by mouth every 6 (six) hours as needed for Pain.       ALLEGRA-D 12 HOUR  mg per tablet  3/10/2017: Received from: External Pharmacy  2    buPROPion (WELLBUTRIN XL) 150 MG TB24 tablet TAKE 1 TABLET(150 MG) BY MOUTH EVERY DAY  90 tablet 1    ibuprofen (ADVIL,MOTRIN) 800 MG tablet Take 1 tablet (800 mg total) by mouth 3 (three) times daily.  30 tablet 1    ketoconazole (NIZORAL) 2 % cream Apply topically once daily.  30 g 1    LACTOBACILLUS ACIDOPHILUS (PROBIOTIC ORAL) Take by mouth once daily.       psyllium (METAMUCIL) packet Take 1 packet by mouth once daily.       rabeprazole (ACIPHEX) 20 mg tablet TK 1 T PO QD 3/21/2017: Post op  2    sulfacetamide sodium-sulfur 10-5 % (w/w) Clsr  3/10/2017: Received from: External Pharmacy  1     No facility-administered encounter medications on file as of 9/25/2023.       Allergies:  Review of patient's allergies indicates:   Allergen Reactions    Penicillins      UNKNOWN         Physical Exam      Vitals:    09/25/23 1451   BP: 118/80   Pulse: 81   Resp: 18   Temp: 98 °F (36.7 °C)        Vital Signs  Temp: 98 °F (36.7 °C)  Temp Source: Oral  Pulse: 81  Resp: 18  SpO2: 97 %  BP: 118/80  BP Location: Right arm  Patient Position: Sitting  Pain Score: 0-No pain  Height and Weight  Height: 5' 10" (177.8 cm)  Weight: 85.9 kg (189 lb 6 oz)  BSA (Calculated - sq m): 2.06 sq meters  BMI (Calculated): 27.2  Weight in (lb) to have " "BMI = 25: 173.9]     Body mass index is 27.17 kg/m².    Physical Exam  Constitutional:       Appearance: He is well-developed.   HENT:      Head: Normocephalic.   Eyes:      Pupils: Pupils are equal, round, and reactive to light.   Neck:      Thyroid: No thyromegaly.   Cardiovascular:      Rate and Rhythm: Normal rate and regular rhythm.      Heart sounds: No murmur heard.     No friction rub. No gallop.   Pulmonary:      Effort: Pulmonary effort is normal.      Breath sounds: Normal breath sounds.   Abdominal:      General: Bowel sounds are normal.      Palpations: Abdomen is soft.   Musculoskeletal:         General: Normal range of motion.      Cervical back: Normal range of motion.   Skin:     General: Skin is warm and dry.   Neurological:      Mental Status: He is alert and oriented to person, place, and time.      Sensory: No sensory deficit.   Psychiatric:         Behavior: Behavior normal.          Laboratory:  CBC:  No results for input(s): "WBC", "RBC", "HGB", "HCT", "PLT", "MCV", "MCH", "MCHC" in the last 2160 hours.  CMP:  No results for input(s): "GLU", "CALCIUM", "ALBUMIN", "PROT", "NA", "K", "CO2", "CL", "BUN", "ALKPHOS", "ALT", "AST", "BILITOT" in the last 2160 hours.    Invalid input(s): "CREATININ"  URINALYSIS:  No results for input(s): "COLORU", "CLARITYU", "SPECGRAV", "PHUR", "PROTEINUA", "GLUCOSEU", "BILIRUBINCON", "BLOODU", "WBCU", "RBCU", "BACTERIA", "MUCUS", "NITRITE", "LEUKOCYTESUR", "UROBILINOGEN", "HYALINECASTS" in the last 2160 hours.   LIPIDS:  No results for input(s): "TSH", "HDL", "CHOL", "TRIG", "LDLCALC", "CHOLHDL", "NONHDLCHOL", "TOTALCHOLEST" in the last 2160 hours.  TSH:  No results for input(s): "TSH" in the last 2160 hours.  A1C:  No results for input(s): "HGBA1C" in the last 2160 hours.    Radiology:        Assessment:     Noel CARDENAS Alona Jones is a 57 y.o.male with:    Annual physical exam  -     CBC Auto Differential; Future; Expected date: 09/25/2023  -     Comprehensive " Metabolic Panel; Future; Expected date: 09/25/2023  -     Lipid Panel; Future; Expected date: 09/25/2023    Prostate cancer screening  -     PSA, Screening; Future; Expected date: 09/25/2023                Plan:     Problem List Items Addressed This Visit          Renal/    Prostate cancer screening    Overview     Check psa            Other    Annual physical exam - Primary    Overview     pe documented needs full screening labs will make recs when all reviewed             As above, continue current medications and maintain follow up with specialists.  Return to clinic in 12 months.     Frederick W Dantagnan [facility]

## 2023-09-28 LAB
ALBUMIN SERPL-MCNC: 4.7 G/DL (ref 3.6–5.1)
ALBUMIN/GLOB SERPL: 1.9 (CALC) (ref 1–2.5)
ALP SERPL-CCNC: 47 U/L (ref 35–144)
ALT SERPL-CCNC: 16 U/L (ref 9–46)
AST SERPL-CCNC: 15 U/L (ref 10–35)
BASOPHILS # BLD AUTO: 30 CELLS/UL (ref 0–200)
BASOPHILS NFR BLD AUTO: 0.5 %
BILIRUB SERPL-MCNC: 0.9 MG/DL (ref 0.2–1.2)
BUN SERPL-MCNC: 19 MG/DL (ref 7–25)
BUN/CREAT SERPL: ABNORMAL (CALC) (ref 6–22)
CALCIUM SERPL-MCNC: 9.5 MG/DL (ref 8.6–10.3)
CHLORIDE SERPL-SCNC: 103 MMOL/L (ref 98–110)
CHOLEST SERPL-MCNC: 195 MG/DL
CHOLEST/HDLC SERPL: 2.7 (CALC)
CO2 SERPL-SCNC: 28 MMOL/L (ref 20–32)
CREAT SERPL-MCNC: 1.22 MG/DL (ref 0.7–1.3)
EGFR: 68 ML/MIN/1.73M2
EOSINOPHIL # BLD AUTO: 118 CELLS/UL (ref 15–500)
EOSINOPHIL NFR BLD AUTO: 2 %
ERYTHROCYTE [DISTWIDTH] IN BLOOD BY AUTOMATED COUNT: 12 % (ref 11–15)
GLOBULIN SER CALC-MCNC: 2.5 G/DL (CALC) (ref 1.9–3.7)
GLUCOSE SERPL-MCNC: 108 MG/DL (ref 65–99)
HCT VFR BLD AUTO: 45.3 % (ref 38.5–50)
HDLC SERPL-MCNC: 72 MG/DL
HGB BLD-MCNC: 15.2 G/DL (ref 13.2–17.1)
LDLC SERPL CALC-MCNC: 101 MG/DL (CALC)
LYMPHOCYTES # BLD AUTO: 1935 CELLS/UL (ref 850–3900)
LYMPHOCYTES NFR BLD AUTO: 32.8 %
MCH RBC QN AUTO: 31.2 PG (ref 27–33)
MCHC RBC AUTO-ENTMCNC: 33.6 G/DL (ref 32–36)
MCV RBC AUTO: 93 FL (ref 80–100)
MONOCYTES # BLD AUTO: 578 CELLS/UL (ref 200–950)
MONOCYTES NFR BLD AUTO: 9.8 %
NEUTROPHILS # BLD AUTO: 3239 CELLS/UL (ref 1500–7800)
NEUTROPHILS NFR BLD AUTO: 54.9 %
NONHDLC SERPL-MCNC: 123 MG/DL (CALC)
PLATELET # BLD AUTO: 209 THOUSAND/UL (ref 140–400)
PMV BLD REES-ECKER: 10 FL (ref 7.5–12.5)
POTASSIUM SERPL-SCNC: 4.5 MMOL/L (ref 3.5–5.3)
PROT SERPL-MCNC: 7.2 G/DL (ref 6.1–8.1)
PSA SERPL-MCNC: 2.55 NG/ML
RBC # BLD AUTO: 4.87 MILLION/UL (ref 4.2–5.8)
SODIUM SERPL-SCNC: 139 MMOL/L (ref 135–146)
TRIGL SERPL-MCNC: 119 MG/DL
WBC # BLD AUTO: 5.9 THOUSAND/UL (ref 3.8–10.8)

## 2023-10-12 RX ORDER — BUPROPION HYDROCHLORIDE 150 MG/1
150 TABLET ORAL
Qty: 90 TABLET | Refills: 3 | Status: SHIPPED | OUTPATIENT
Start: 2023-10-12

## 2023-10-12 NOTE — TELEPHONE ENCOUNTER
Refill Decision Note      Refill Decision Note   Noel Sage  is requesting a refill authorization.  Brief Assessment and Rationale for Refill:  Approve     Medication Therapy Plan:         Comments:     Note composed:9:31 AM 10/12/2023             Appointments     Last Visit   9/25/2023 Tito Espinoza MD   Next Visit   3/28/2024 Tito Espinoza MD

## 2023-10-12 NOTE — TELEPHONE ENCOUNTER
No care due was identified.  [facility] Embedded Care Due Messages. Reference number: [id number].   10/12/2023 7:13:30 AM CDT

## 2023-12-25 PROBLEM — Z00.00 ANNUAL PHYSICAL EXAM: Status: RESOLVED | Noted: 2020-07-30 | Resolved: 2023-12-25

## 2024-02-02 LAB — CRC RECOMMENDATION EXT: NORMAL

## 2024-02-05 ENCOUNTER — PATIENT OUTREACH (OUTPATIENT)
Dept: ADMINISTRATIVE | Facility: HOSPITAL | Age: 59
End: 2024-02-05
Payer: COMMERCIAL

## 2024-03-28 ENCOUNTER — OFFICE VISIT (OUTPATIENT)
Dept: PRIMARY CARE CLINIC | Facility: CLINIC | Age: 59
End: 2024-03-28
Payer: COMMERCIAL

## 2024-03-28 VITALS
HEIGHT: 70 IN | SYSTOLIC BLOOD PRESSURE: 130 MMHG | BODY MASS INDEX: 28.02 KG/M2 | OXYGEN SATURATION: 97 % | DIASTOLIC BLOOD PRESSURE: 88 MMHG | WEIGHT: 195.75 LBS | HEART RATE: 83 BPM

## 2024-03-28 DIAGNOSIS — F33.0 DEPRESSION, MAJOR, RECURRENT, MILD: ICD-10-CM

## 2024-03-28 DIAGNOSIS — N40.0 BENIGN PROSTATIC HYPERPLASIA, UNSPECIFIED WHETHER LOWER URINARY TRACT SYMPTOMS PRESENT: Primary | ICD-10-CM

## 2024-03-28 DIAGNOSIS — M25.511 ACUTE PAIN OF RIGHT SHOULDER: ICD-10-CM

## 2024-03-28 PROCEDURE — 99999 PR PBB SHADOW E&M-EST. PATIENT-LVL III: CPT | Mod: PBBFAC,,, | Performed by: INTERNAL MEDICINE

## 2024-03-28 PROCEDURE — 99214 OFFICE O/P EST MOD 30 MIN: CPT | Mod: S$GLB,,, | Performed by: INTERNAL MEDICINE

## 2024-03-28 RX ORDER — TAMSULOSIN HYDROCHLORIDE 0.4 MG/1
0.4 CAPSULE ORAL DAILY
Qty: 30 CAPSULE | Refills: 5 | Status: SHIPPED | OUTPATIENT
Start: 2024-03-28 | End: 2025-03-28

## 2024-03-28 NOTE — PROGRESS NOTES
[facility] Note    Chief Complaint      Chief Complaint   Patient presents with    Follow-up     6 month       History of Present Illness      Noel Sage Jr. is a 58 y.o. male who presents today for   Chief Complaint   Patient presents with    Follow-up     6 month   .  Patient comes to appointment here for 6m checkup for chronic issues as below. He is currently dealing with right shoulder /rotator cuff pain has visit scheduled soon with ortho . He is having some issues with stiffness . He is seeing urology soon for nocturia .    HPI    No problem-specific Assessment & Plan notes found for this encounter.       Problem List Items Addressed This Visit          Psychiatric    Depression, major, recurrent, mild    Overview     Cont welbutrin as prescribed . Is now seeing therapist as well which has been helping             Renal/    Benign prostatic hyperplasia - Primary    Overview     Start flomax             Orthopedic    Right shoulder pain    Overview     Will be seeing orhto soon             Past Medical History:  Past Medical History:   Diagnosis Date    GERD (gastroesophageal reflux disease)     Seasonal allergies        Past Surgical History:  Past Surgical History:   Procedure Laterality Date    COLONOSCOPY  10/11/2013    ESOPHAGOGASTRODUODENOSCOPY      LEG SURGERY Left     torn muscle, repaired, age 18's       Family History:  family history is not on file.     Social History:  Social History     Socioeconomic History    Marital status:    Tobacco Use    Smoking status: Some Days     Types: Cigars    Smokeless tobacco: Never   Substance and Sexual Activity    Alcohol use: Yes     Comment: on weekends occasionally    Drug use: No       Review of Systems:   Review of Systems   Constitutional:  Negative for fever and weight loss.   HENT:  Negative for congestion, hearing loss and sore throat.    Eyes:  Negative for blurred vision.   Respiratory:  Negative for cough and  shortness of breath.    Cardiovascular:  Negative for chest pain, palpitations, claudication and leg swelling.   Gastrointestinal:  Negative for abdominal pain, constipation, diarrhea and heartburn.   Genitourinary:  Positive for urgency. Negative for dysuria.        Nocturia   Musculoskeletal:  Negative for back pain and myalgias.   Skin:  Negative for rash.   Neurological:  Negative for focal weakness and headaches.   Psychiatric/Behavioral:  Negative for depression and suicidal ideas. The patient is not nervous/anxious.         Medications:  Outpatient Encounter Medications as of 3/28/2024   Medication Sig Note Dispense Refill    acetaminophen (TYLENOL) 500 MG tablet Take 500 mg by mouth every 6 (six) hours as needed for Pain.       ALLEGRA-D 12 HOUR  mg per tablet  3/10/2017: Received from: External Pharmacy  2    buPROPion (WELLBUTRIN XL) 150 MG TB24 tablet TAKE 1 TABLET BY MOUTH EVERY DAY  90 tablet 3    ibuprofen (ADVIL,MOTRIN) 800 MG tablet Take 1 tablet (800 mg total) by mouth 3 (three) times daily.  30 tablet 1    ketoconazole (NIZORAL) 2 % cream Apply topically once daily.  30 g 1    LACTOBACILLUS ACIDOPHILUS (PROBIOTIC ORAL) Take by mouth once daily.       psyllium (METAMUCIL) packet Take 1 packet by mouth once daily.       rabeprazole (ACIPHEX) 20 mg tablet TK 1 T PO QD 3/21/2017: Post op  2    sulfacetamide sodium-sulfur 10-5 % (w/w) Clsr  3/10/2017: Received from: External Pharmacy  1    tamsulosin (FLOMAX) 0.4 mg Cap Take 1 capsule (0.4 mg total) by mouth once daily.  30 capsule 5     No facility-administered encounter medications on file as of 3/28/2024.       Allergies:  Review of patient's allergies indicates:   Allergen Reactions    Penicillins      UNKNOWN         Physical Exam      Vitals:    03/28/24 1455   BP: 130/88   Pulse: 83        Vital Signs  Pulse: 83  SpO2: 97 %  BP: 130/88  BP Location: Right arm  Patient Position: Sitting  Pain Score: 0-No pain  Height and Weight  Height: 5'  "10" (177.8 cm)  Weight: 88.8 kg (195 lb 12.3 oz)  BSA (Calculated - sq m): 2.09 sq meters  BMI (Calculated): 28.1  Weight in (lb) to have BMI = 25: 173.9]     Body mass index is 28.09 kg/m².    Physical Exam  Constitutional:       Appearance: He is well-developed.   HENT:      Head: Normocephalic.   Eyes:      Pupils: Pupils are equal, round, and reactive to light.   Neck:      Thyroid: No thyromegaly.   Cardiovascular:      Rate and Rhythm: Normal rate and regular rhythm.      Heart sounds: No murmur heard.     No friction rub. No gallop.   Pulmonary:      Effort: Pulmonary effort is normal.      Breath sounds: Normal breath sounds.   Abdominal:      General: Bowel sounds are normal.      Palpations: Abdomen is soft.   Musculoskeletal:         General: Normal range of motion.      Cervical back: Normal range of motion.   Skin:     General: Skin is warm and dry.   Neurological:      Mental Status: He is alert and oriented to person, place, and time.      Sensory: No sensory deficit.   Psychiatric:         Behavior: Behavior normal.          Laboratory:  CBC:  No results for input(s): "WBC", "RBC", "HGB", "HCT", "PLT", "MCV", "MCH", "MCHC" in the last 2160 hours.  CMP:  No results for input(s): "GLU", "CALCIUM", "ALBUMIN", "PROT", "NA", "K", "CO2", "CL", "BUN", "ALKPHOS", "ALT", "AST", "BILITOT" in the last 2160 hours.    Invalid input(s): "CREATININ"  URINALYSIS:  No results for input(s): "COLORU", "CLARITYU", "SPECGRAV", "PHUR", "PROTEINUA", "GLUCOSEU", "BILIRUBINCON", "BLOODU", "WBCU", "RBCU", "BACTERIA", "MUCUS", "NITRITE", "LEUKOCYTESUR", "UROBILINOGEN", "HYALINECASTS" in the last 2160 hours.   LIPIDS:  No results for input(s): "TSH", "HDL", "CHOL", "TRIG", "LDLCALC", "CHOLHDL", "NONHDLCHOL", "TOTALCHOLEST" in the last 2160 hours.  TSH:  No results for input(s): "TSH" in the last 2160 hours.  A1C:  No results for input(s): "HGBA1C" in the last 2160 hours.    Radiology:        Assessment:     Noel Sage Jr. " is a 58 y.o.male with:    Benign prostatic hyperplasia, unspecified whether lower urinary tract symptoms present  -     tamsulosin (FLOMAX) 0.4 mg Cap; Take 1 capsule (0.4 mg total) by mouth once daily.  Dispense: 30 capsule; Refill: 5    Depression, major, recurrent, mild    Acute pain of right shoulder                Plan:     Problem List Items Addressed This Visit          Psychiatric    Depression, major, recurrent, mild    Overview     Cont welbutrin as prescribed . Is now seeing therapist as well which has been helping             Renal/    Benign prostatic hyperplasia - Primary    Overview     Start flomax             Orthopedic    Right shoulder pain    Overview     Will be seeing orhto soon            As above, continue current medications and maintain follow up with specialists.  Return to clinic in 6 months.      Frederick W Dantagnan [facility]

## 2024-05-28 ENCOUNTER — PATIENT MESSAGE (OUTPATIENT)
Dept: PRIMARY CARE CLINIC | Facility: CLINIC | Age: 59
End: 2024-05-28
Payer: COMMERCIAL

## 2024-07-03 ENCOUNTER — PATIENT MESSAGE (OUTPATIENT)
Dept: PRIMARY CARE CLINIC | Facility: CLINIC | Age: 59
End: 2024-07-03
Payer: COMMERCIAL

## 2024-08-26 NOTE — TELEPHONE ENCOUNTER
No care due was identified.  [facility] Embedded Care Due Messages. Reference number: [id number].   8/26/2024 5:25:01 PM CDT

## 2024-08-27 RX ORDER — BUPROPION HYDROCHLORIDE 150 MG/1
150 TABLET ORAL
Qty: 90 TABLET | Refills: 1 | Status: SHIPPED | OUTPATIENT
Start: 2024-08-27

## 2024-08-27 NOTE — TELEPHONE ENCOUNTER
Noel Sage  is requesting a refill authorization.  Brief Assessment and Rationale for Refill:  Approve     Medication Therapy Plan:         Comments:     Note composed:5:00 AM 08/27/2024

## 2024-09-18 ENCOUNTER — PATIENT MESSAGE (OUTPATIENT)
Dept: PRIMARY CARE CLINIC | Facility: CLINIC | Age: 59
End: 2024-09-18
Payer: COMMERCIAL

## 2024-09-18 DIAGNOSIS — Z00.00 ANNUAL PHYSICAL EXAM: Primary | ICD-10-CM

## 2024-09-18 DIAGNOSIS — Z12.5 PROSTATE CANCER SCREENING: ICD-10-CM

## 2024-09-18 NOTE — TELEPHONE ENCOUNTER
Pt responding to 5/28 message about changing 3/28/2024 office visit to an annual exam    Pt wast told that visit was a 6 month f/u and that his annual was performed on 9/2023 and it can't be changed

## 2024-10-11 ENCOUNTER — PATIENT MESSAGE (OUTPATIENT)
Dept: PRIMARY CARE CLINIC | Facility: CLINIC | Age: 59
End: 2024-10-11
Payer: COMMERCIAL

## 2024-10-11 DIAGNOSIS — Z00.00 ANNUAL PHYSICAL EXAM: Primary | ICD-10-CM

## 2024-10-21 ENCOUNTER — OFFICE VISIT (OUTPATIENT)
Dept: PRIMARY CARE CLINIC | Facility: CLINIC | Age: 59
End: 2024-10-21
Payer: COMMERCIAL

## 2024-10-21 VITALS
HEIGHT: 70 IN | OXYGEN SATURATION: 98 % | SYSTOLIC BLOOD PRESSURE: 118 MMHG | DIASTOLIC BLOOD PRESSURE: 70 MMHG | BODY MASS INDEX: 26.7 KG/M2 | WEIGHT: 186.5 LBS | RESPIRATION RATE: 18 BRPM | HEART RATE: 73 BPM | TEMPERATURE: 98 F

## 2024-10-21 DIAGNOSIS — Z00.00 ANNUAL PHYSICAL EXAM: Primary | ICD-10-CM

## 2024-10-21 PROCEDURE — 99396 PREV VISIT EST AGE 40-64: CPT | Mod: S$GLB,,, | Performed by: INTERNAL MEDICINE

## 2024-10-21 PROCEDURE — 99999 PR PBB SHADOW E&M-EST. PATIENT-LVL IV: CPT | Mod: PBBFAC,,, | Performed by: INTERNAL MEDICINE

## 2024-10-21 NOTE — PROGRESS NOTES
[facility] Note    Chief Complaint      Chief Complaint   Patient presents with    Annual Exam       History of Present Illness      Noel Sage Jr. is a 59 y.o. male who presents today for   Chief Complaint   Patient presents with    Annual Exam   .  Patient comes to appointment here for annual preventative visit with me .he had full screening labs . I have reivewed all with patient today . All look good .he is currently managing diet and is getting regular exercise as well     HPI    No problem-specific Assessment & Plan notes found for this encounter.       Problem List Items Addressed This Visit       Annual physical exam - Primary    Overview     pe documented needs full screening labs will make recs when all reviewed              Past Medical History:  Past Medical History:   Diagnosis Date    GERD (gastroesophageal reflux disease)     Seasonal allergies        Past Surgical History:  Past Surgical History:   Procedure Laterality Date    COLONOSCOPY  10/11/2013    ESOPHAGOGASTRODUODENOSCOPY      LEG SURGERY Left     torn muscle, repaired, age 18's       Family History:  family history is not on file.     Social History:  Social History     Socioeconomic History    Marital status:    Tobacco Use    Smoking status: Some Days     Types: Cigars    Smokeless tobacco: Never   Substance and Sexual Activity    Alcohol use: Yes     Comment: on weekends occasionally    Drug use: No     Social Drivers of Health     Financial Resource Strain: Low Risk  (4/7/2024)    Received from [facility]    Overall Financial Resource Strain (CARDIA)     Difficulty of Paying Living Expenses: Not very hard   Food Insecurity: Unknown (4/7/2024)    Received from [facility]    Hunger Vital Sign     Worried About Running Out of Food in the Last Year: Never true   Transportation Needs: Unknown (4/7/2024)    Received from [facility]    PRAPARE - Transportation     Lack of Transportation (Non-Medical): No    Physical Activity: Unknown (4/7/2024)    Received from [facility] [place]    Exercise Vital Sign     Days of Exercise per Week: 0 days   Stress: Patient Declined (4/7/2024)    Received from [facility] [place]    Citizen of the [place] [employer] Occup[employer] - Occupational Stress Questionnaire     Feeling of Stress : Patient declined   Housing Stability: Unknown (4/7/2024)    Received from [facility] [place]    Housing Stability Vital Sign     Unable to Pay for Housing in the Last Year: No     Unstable Housing in the Last Year: No       Review of Systems:   Review of Systems   Constitutional:  Negative for fever and weight loss.   HENT:  Negative for congestion, hearing loss and sore throat.    Eyes:  Negative for blurred vision.   Respiratory:  Negative for cough and shortness of breath.    Cardiovascular:  Negative for chest pain, palpitations, claudication and leg swelling.   Gastrointestinal:  Negative for abdominal pain, constipation, diarrhea, heartburn, nausea and vomiting.   Genitourinary:  Negative for dysuria.   Musculoskeletal:  Negative for back pain and myalgias.   Skin:  Negative for rash.   Neurological:  Negative for focal weakness and headaches.   Psychiatric/Behavioral:  Negative for depression, memory loss and suicidal ideas. The patient is not nervous/anxious.         Medications:  Outpatient Encounter Medications as of 10/21/2024   Medication Sig Note Dispense Refill    acetaminophen (TYLENOL) 500 MG tablet Take 500 mg by mouth every 6 (six) hours as needed for Pain.       ALLEGRA-D 12 HOUR  mg per tablet  3/10/2017: Received from: External Pharmacy  2    buPROPion (WELLBUTRIN XL) 150 MG TB24 tablet TAKE ONE TABLET BY MOUTH EVERY DAY  90 tablet 1    ibuprofen (ADVIL,MOTRIN) 800 MG tablet Take 1 tablet (800 mg total) by mouth 3 (three) times daily.  30 tablet 1    ketoconazole (NIZORAL) 2 % cream Apply topically once daily.  30 g 1    LACTOBACILLUS ACIDOPHILUS (PROBIOTIC ORAL) Take by mouth once daily.        "psyllium (METAMUCIL) packet Take 1 packet by mouth once daily.       rabeprazole (ACIPHEX) 20 mg tablet TK 1 T PO QD 3/21/2017: Post op  2    sulfacetamide sodium-sulfur 10-5 % (w/w) Clsr  3/10/2017: Received from: External Pharmacy  1    tamsulosin (FLOMAX) 0.4 mg Cap Take 1 capsule (0.4 mg total) by mouth once daily.  30 capsule 5     No facility-administered encounter medications on file as of 10/21/2024.       Allergies:  Review of patient's allergies indicates:   Allergen Reactions    Penicillins      UNKNOWN         Physical Exam      Vitals:    10/21/24 1519   BP: 118/70   Pulse: 73   Resp: 18   Temp: 98 °F (36.7 °C)        Vital Signs  Temp: 98 °F (36.7 °C)  Temp Source: Oral  Pulse: 73  Resp: 18  SpO2: 98 %  BP: 118/70  BP Location: Left arm  Patient Position: Sitting  Pain Score: 0-No pain  Height and Weight  Height: 5' 10" (177.8 cm)  Weight: 84.6 kg (186 lb 8.2 oz)  BSA (Calculated - sq m): 2.04 sq meters  BMI (Calculated): 26.8  Weight in (lb) to have BMI = 25: 173.9]     Body mass index is 26.76 kg/m².    Physical Exam  Constitutional:       Appearance: He is well-developed.   HENT:      Head: Normocephalic.   Eyes:      Pupils: Pupils are equal, round, and reactive to light.   Neck:      Thyroid: No thyromegaly.   Cardiovascular:      Rate and Rhythm: Normal rate and regular rhythm.      Heart sounds: No murmur heard.     No friction rub. No gallop.   Pulmonary:      Effort: Pulmonary effort is normal.      Breath sounds: Normal breath sounds.   Abdominal:      General: Bowel sounds are normal.      Palpations: Abdomen is soft.   Musculoskeletal:         General: Normal range of motion.      Cervical back: Normal range of motion.   Skin:     General: Skin is warm and dry.   Neurological:      Mental Status: He is alert and oriented to person, place, and time.      Sensory: No sensory deficit.   Psychiatric:         Behavior: Behavior normal.          Laboratory:  CBC:  Recent Labs   Lab Result Units " "10/10/24  0804   WBC Thousand/uL 5.7   RBC Million/uL 4.72   Hemoglobin g/dL 14.4   Hematocrit % 44.9   Platelets Thousand/uL 221   MCV fL 95.1   MCH pg 30.5   MCHC g/dL 32.1     CMP:  Recent Labs   Lab Result Units 10/10/24  0804   Glucose mg/dL 109*   Calcium mg/dL 9.6   Albumin g/dL 4.5   Total Protein g/dL 7.0   Sodium mmol/L 140   Potassium mmol/L 4.4   CO2 mmol/L 28   Chloride mmol/L 104   BUN mg/dL 17   ALT U/L 15   AST U/L 16   Total Bilirubin mg/dL 1.1     URINALYSIS:  No results for input(s): "COLORU", "CLARITYU", "SPECGRAV", "PHUR", "PROTEINUA", "GLUCOSEU", "BILIRUBINCON", "BLOODU", "WBCU", "RBCU", "BACTERIA", "MUCUS", "NITRITE", "LEUKOCYTESUR", "UROBILINOGEN", "HYALINECASTS" in the last 2160 hours.   LIPIDS:  Recent Labs   Lab Result Units 10/10/24  0804   HDL mg/dL 63   Cholesterol mg/dL 189   Triglycerides mg/dL 79   LDL Cholesterol mg/dL (calc) 109*   HDL/Cholesterol Ratio (calc) 3.0   Non HDL Chol. (LDL+VLDL) mg/dL (calc) 126     TSH:  No results for input(s): "TSH" in the last 2160 hours.  A1C:  No results for input(s): "HGBA1C" in the last 2160 hours.    Radiology:        Assessment:     Noel Sage Jr. is a 59 y.o.male with:    Annual physical exam                Plan:     Problem List Items Addressed This Visit       Annual physical exam - Primary    Overview     pe documented needs full screening labs will make recs when all reviewed             As above, continue current medications and maintain follow up with specialists.  Return to clinic in 6 months.      Frederick W Dantagnan [facility]                  "

## 2025-06-03 RX ORDER — BUPROPION HYDROCHLORIDE 150 MG/1
150 TABLET ORAL
Qty: 90 TABLET | Refills: 1 | Status: SHIPPED | OUTPATIENT
Start: 2025-06-03

## 2025-08-27 RX ORDER — BUPROPION HYDROCHLORIDE 150 MG/1
150 TABLET ORAL
Qty: 90 TABLET | Refills: 3 | Status: SHIPPED | OUTPATIENT
Start: 2025-08-27

## (undated) DEVICE — PAD ELECTRODE STER 1.5X3

## (undated) DEVICE — GLOVE BIOGEL SKINSENSE PI 7.0

## (undated) DEVICE — SEE MEDLINE ITEM 152529

## (undated) DEVICE — GAUZE SPONGE 4X4 12PLY

## (undated) DEVICE — SUT PDS VIL/BLU DUAL ORTHO

## (undated) DEVICE — SEE MEDLINE ITEM 146347

## (undated) DEVICE — GLOVE ORTHO PF SZ 8.5

## (undated) DEVICE — TOURNIQUET SB QC SP 18X4IN

## (undated) DEVICE — SEE MEDLINE ITEM 153151

## (undated) DEVICE — SLING SHOT II LARGE

## (undated) DEVICE — SUPPORT SLING SHOT II MEDIUM

## (undated) DEVICE — SEE MEDLINE ITEM 152622

## (undated) DEVICE — SUT VICRYL PLUS 3-0 SH 18IN

## (undated) DEVICE — ADHESIVE MASTISOL VIAL 48/BX

## (undated) DEVICE — NDL SURGEON MAYO #7 2/PK 72PKS

## (undated) DEVICE — ELECTRODE REM PLYHSV RETURN 9

## (undated) DEVICE — SEE MEDLINE ITEM 157131

## (undated) DEVICE — GLOVE SURGEON SYN PF SZ 9

## (undated) DEVICE — CLOSURE SKIN STERI STRIP 1/2X4

## (undated) DEVICE — APPLICATOR CHLORAPREP ORN 26ML

## (undated) DEVICE — Device

## (undated) DEVICE — UNDERGLOVES BIOGEL PI SIZE 7.5

## (undated) DEVICE — SEE MEDLINE ITEM 146308

## (undated) DEVICE — GOWN SMART IMP BREATHABLE XXLG

## (undated) DEVICE — SPONGE LAP 18X18 PREWASHED

## (undated) DEVICE — SUT VICRYL PLUS 0 CT1 18IN

## (undated) DEVICE — DRESSING GZ SURGICOUNT 4X8

## (undated) DEVICE — DRAPE SURG W/TWL 17 5/8X23

## (undated) DEVICE — PADDING CAST 4IN

## (undated) DEVICE — PACK UPPER EXTREMITY BAPTIST

## (undated) DEVICE — SOL 9P NACL IRR PIC IL

## (undated) DEVICE — PAD CAST SPECIALIST STRL 4

## (undated) DEVICE — DRESSING XEROFORM FOIL PK 1X8

## (undated) DEVICE — SUT MCRYL PLUS 4-0 PS2 27IN